# Patient Record
Sex: FEMALE | Race: WHITE | NOT HISPANIC OR LATINO | Employment: FULL TIME | URBAN - METROPOLITAN AREA
[De-identification: names, ages, dates, MRNs, and addresses within clinical notes are randomized per-mention and may not be internally consistent; named-entity substitution may affect disease eponyms.]

---

## 2017-06-01 ENCOUNTER — TRANSFERRED RECORDS (OUTPATIENT)
Dept: HEALTH INFORMATION MANAGEMENT | Facility: CLINIC | Age: 47
End: 2017-06-01

## 2017-06-27 ENCOUNTER — HOSPITAL ENCOUNTER (OUTPATIENT)
Dept: ULTRASOUND IMAGING | Facility: CLINIC | Age: 47
End: 2017-06-27
Attending: OBSTETRICS & GYNECOLOGY | Admitting: OBSTETRICS & GYNECOLOGY
Payer: COMMERCIAL

## 2017-06-27 ENCOUNTER — HOSPITAL ENCOUNTER (OUTPATIENT)
Facility: CLINIC | Age: 47
Discharge: HOME | End: 2017-06-27
Attending: OBSTETRICS & GYNECOLOGY | Admitting: OBSTETRICS & GYNECOLOGY
Payer: COMMERCIAL

## 2017-06-27 ENCOUNTER — TRANSFERRED RECORDS (OUTPATIENT)
Dept: HEALTH INFORMATION MANAGEMENT | Facility: CLINIC | Age: 47
End: 2017-06-27

## 2017-06-27 ENCOUNTER — ANESTHESIA EVENT (OUTPATIENT)
Dept: SURGERY | Facility: CLINIC | Age: 47
End: 2017-06-27
Payer: COMMERCIAL

## 2017-06-27 ENCOUNTER — ANESTHESIA (OUTPATIENT)
Dept: SURGERY | Facility: CLINIC | Age: 47
End: 2017-06-27
Payer: COMMERCIAL

## 2017-06-27 VITALS
SYSTOLIC BLOOD PRESSURE: 120 MMHG | WEIGHT: 169.4 LBS | OXYGEN SATURATION: 99 % | TEMPERATURE: 98 F | DIASTOLIC BLOOD PRESSURE: 72 MMHG | BODY MASS INDEX: 30.02 KG/M2 | RESPIRATION RATE: 16 BRPM | HEIGHT: 63 IN

## 2017-06-27 DIAGNOSIS — Z98.890 S/P D&C (STATUS POST DILATION AND CURETTAGE): Primary | ICD-10-CM

## 2017-06-27 DIAGNOSIS — N92.1 METRORRHAGIA: ICD-10-CM

## 2017-06-27 LAB — HCG SERPL QL: NEGATIVE

## 2017-06-27 PROCEDURE — 25000128 H RX IP 250 OP 636: Performed by: OBSTETRICS & GYNECOLOGY

## 2017-06-27 PROCEDURE — 88305 TISSUE EXAM BY PATHOLOGIST: CPT | Mod: 26 | Performed by: OBSTETRICS & GYNECOLOGY

## 2017-06-27 PROCEDURE — 25000128 H RX IP 250 OP 636: Performed by: ANESTHESIOLOGY

## 2017-06-27 PROCEDURE — 25000132 ZZH RX MED GY IP 250 OP 250 PS 637: Performed by: ANESTHESIOLOGY

## 2017-06-27 PROCEDURE — 71000013 ZZH RECOVERY PHASE 1 LEVEL 1 EA ADDTL HR: Performed by: OBSTETRICS & GYNECOLOGY

## 2017-06-27 PROCEDURE — 37000008 ZZH ANESTHESIA TECHNICAL FEE, 1ST 30 MIN: Performed by: OBSTETRICS & GYNECOLOGY

## 2017-06-27 PROCEDURE — 71000027 ZZH RECOVERY PHASE 2 EACH 15 MINS: Performed by: OBSTETRICS & GYNECOLOGY

## 2017-06-27 PROCEDURE — 25000128 H RX IP 250 OP 636: Performed by: NURSE ANESTHETIST, CERTIFIED REGISTERED

## 2017-06-27 PROCEDURE — 88305 TISSUE EXAM BY PATHOLOGIST: CPT | Performed by: OBSTETRICS & GYNECOLOGY

## 2017-06-27 PROCEDURE — 84703 CHORIONIC GONADOTROPIN ASSAY: CPT | Performed by: ANESTHESIOLOGY

## 2017-06-27 PROCEDURE — 36415 COLL VENOUS BLD VENIPUNCTURE: CPT | Performed by: ANESTHESIOLOGY

## 2017-06-27 PROCEDURE — 40000306 ZZH STATISTIC PRE PROC ASSESS II: Performed by: OBSTETRICS & GYNECOLOGY

## 2017-06-27 PROCEDURE — 71000012 ZZH RECOVERY PHASE 1 LEVEL 1 FIRST HR: Performed by: OBSTETRICS & GYNECOLOGY

## 2017-06-27 PROCEDURE — 36000050 ZZH SURGERY LEVEL 2 1ST 30 MIN: Performed by: OBSTETRICS & GYNECOLOGY

## 2017-06-27 PROCEDURE — 25000566 ZZH SEVOFLURANE, EA 15 MIN: Performed by: OBSTETRICS & GYNECOLOGY

## 2017-06-27 PROCEDURE — 27210794 ZZH OR GENERAL SUPPLY STERILE: Performed by: OBSTETRICS & GYNECOLOGY

## 2017-06-27 PROCEDURE — 25000125 ZZHC RX 250: Performed by: NURSE ANESTHETIST, CERTIFIED REGISTERED

## 2017-06-27 PROCEDURE — 37000009 ZZH ANESTHESIA TECHNICAL FEE, EACH ADDTL 15 MIN: Performed by: OBSTETRICS & GYNECOLOGY

## 2017-06-27 PROCEDURE — 40000864 US INTRAOPERATIVE

## 2017-06-27 RX ORDER — ONDANSETRON 2 MG/ML
INJECTION INTRAMUSCULAR; INTRAVENOUS PRN
Status: DISCONTINUED | OUTPATIENT
Start: 2017-06-27 | End: 2017-06-27

## 2017-06-27 RX ORDER — DIMENHYDRINATE 50 MG/ML
25 INJECTION, SOLUTION INTRAMUSCULAR; INTRAVENOUS
Status: DISCONTINUED | OUTPATIENT
Start: 2017-06-27 | End: 2017-06-27 | Stop reason: HOSPADM

## 2017-06-27 RX ORDER — PROMETHAZINE HYDROCHLORIDE 25 MG/ML
12.5 INJECTION, SOLUTION INTRAMUSCULAR; INTRAVENOUS
Status: DISCONTINUED | OUTPATIENT
Start: 2017-06-27 | End: 2017-06-27 | Stop reason: HOSPADM

## 2017-06-27 RX ORDER — LIDOCAINE 40 MG/G
CREAM TOPICAL
Status: DISCONTINUED | OUTPATIENT
Start: 2017-06-27 | End: 2017-06-27 | Stop reason: HOSPADM

## 2017-06-27 RX ORDER — SODIUM CHLORIDE, SODIUM LACTATE, POTASSIUM CHLORIDE, CALCIUM CHLORIDE 600; 310; 30; 20 MG/100ML; MG/100ML; MG/100ML; MG/100ML
INJECTION, SOLUTION INTRAVENOUS CONTINUOUS
Status: DISCONTINUED | OUTPATIENT
Start: 2017-06-27 | End: 2017-06-27 | Stop reason: HOSPADM

## 2017-06-27 RX ORDER — MEPERIDINE HYDROCHLORIDE 25 MG/ML
12.5 INJECTION INTRAMUSCULAR; INTRAVENOUS; SUBCUTANEOUS
Status: DISCONTINUED | OUTPATIENT
Start: 2017-06-27 | End: 2017-06-27 | Stop reason: HOSPADM

## 2017-06-27 RX ORDER — ONDANSETRON 2 MG/ML
4 INJECTION INTRAMUSCULAR; INTRAVENOUS EVERY 30 MIN PRN
Status: DISCONTINUED | OUTPATIENT
Start: 2017-06-27 | End: 2017-06-27 | Stop reason: HOSPADM

## 2017-06-27 RX ORDER — DEXAMETHASONE SODIUM PHOSPHATE 4 MG/ML
4 INJECTION, SOLUTION INTRA-ARTICULAR; INTRALESIONAL; INTRAMUSCULAR; INTRAVENOUS; SOFT TISSUE EVERY 10 MIN PRN
Status: DISCONTINUED | OUTPATIENT
Start: 2017-06-27 | End: 2017-06-27 | Stop reason: HOSPADM

## 2017-06-27 RX ORDER — FENTANYL CITRATE 50 UG/ML
25-50 INJECTION, SOLUTION INTRAMUSCULAR; INTRAVENOUS
Status: DISCONTINUED | OUTPATIENT
Start: 2017-06-27 | End: 2017-06-27 | Stop reason: HOSPADM

## 2017-06-27 RX ORDER — LIDOCAINE HYDROCHLORIDE 10 MG/ML
INJECTION, SOLUTION INFILTRATION; PERINEURAL PRN
Status: DISCONTINUED | OUTPATIENT
Start: 2017-06-27 | End: 2017-06-27

## 2017-06-27 RX ORDER — OXYCODONE HYDROCHLORIDE 5 MG/1
5 TABLET ORAL EVERY 4 HOURS PRN
Qty: 12 TABLET | Refills: 0 | Status: SHIPPED | OUTPATIENT
Start: 2017-06-27 | End: 2018-04-25

## 2017-06-27 RX ORDER — HYDRALAZINE HYDROCHLORIDE 20 MG/ML
2.5-5 INJECTION INTRAMUSCULAR; INTRAVENOUS EVERY 10 MIN PRN
Status: DISCONTINUED | OUTPATIENT
Start: 2017-06-27 | End: 2017-06-27 | Stop reason: HOSPADM

## 2017-06-27 RX ORDER — DEXAMETHASONE SODIUM PHOSPHATE 4 MG/ML
INJECTION, SOLUTION INTRA-ARTICULAR; INTRALESIONAL; INTRAMUSCULAR; INTRAVENOUS; SOFT TISSUE PRN
Status: DISCONTINUED | OUTPATIENT
Start: 2017-06-27 | End: 2017-06-27

## 2017-06-27 RX ORDER — DROPERIDOL 2.5 MG/ML
0.62 INJECTION, SOLUTION INTRAMUSCULAR; INTRAVENOUS
Status: DISCONTINUED | OUTPATIENT
Start: 2017-06-27 | End: 2017-06-27 | Stop reason: RX

## 2017-06-27 RX ORDER — NALOXONE HYDROCHLORIDE 0.4 MG/ML
.1-.4 INJECTION, SOLUTION INTRAMUSCULAR; INTRAVENOUS; SUBCUTANEOUS
Status: DISCONTINUED | OUTPATIENT
Start: 2017-06-27 | End: 2017-06-27 | Stop reason: HOSPADM

## 2017-06-27 RX ORDER — CEFAZOLIN SODIUM 2 G/100ML
2 INJECTION, SOLUTION INTRAVENOUS
Status: COMPLETED | OUTPATIENT
Start: 2017-06-27 | End: 2017-06-27

## 2017-06-27 RX ORDER — KETOROLAC TROMETHAMINE 30 MG/ML
30 INJECTION, SOLUTION INTRAMUSCULAR; INTRAVENOUS EVERY 6 HOURS PRN
Status: DISCONTINUED | OUTPATIENT
Start: 2017-06-27 | End: 2017-06-27 | Stop reason: HOSPADM

## 2017-06-27 RX ORDER — ACETAMINOPHEN 325 MG/1
650 TABLET ORAL ONCE
Status: COMPLETED | OUTPATIENT
Start: 2017-06-27 | End: 2017-06-27

## 2017-06-27 RX ORDER — METOCLOPRAMIDE 10 MG/1
10 TABLET ORAL EVERY 6 HOURS PRN
Status: DISCONTINUED | OUTPATIENT
Start: 2017-06-27 | End: 2017-06-27 | Stop reason: HOSPADM

## 2017-06-27 RX ORDER — PROPOFOL 10 MG/ML
INJECTION, EMULSION INTRAVENOUS PRN
Status: DISCONTINUED | OUTPATIENT
Start: 2017-06-27 | End: 2017-06-27

## 2017-06-27 RX ORDER — KETOROLAC TROMETHAMINE 30 MG/ML
30 INJECTION, SOLUTION INTRAMUSCULAR; INTRAVENOUS ONCE
Status: COMPLETED | OUTPATIENT
Start: 2017-06-27 | End: 2017-06-27

## 2017-06-27 RX ORDER — FENTANYL CITRATE 50 UG/ML
INJECTION, SOLUTION INTRAMUSCULAR; INTRAVENOUS PRN
Status: DISCONTINUED | OUTPATIENT
Start: 2017-06-27 | End: 2017-06-27

## 2017-06-27 RX ORDER — METOCLOPRAMIDE HYDROCHLORIDE 5 MG/ML
10 INJECTION INTRAMUSCULAR; INTRAVENOUS EVERY 6 HOURS PRN
Status: DISCONTINUED | OUTPATIENT
Start: 2017-06-27 | End: 2017-06-27 | Stop reason: HOSPADM

## 2017-06-27 RX ORDER — ONDANSETRON 4 MG/1
4 TABLET, ORALLY DISINTEGRATING ORAL EVERY 30 MIN PRN
Status: DISCONTINUED | OUTPATIENT
Start: 2017-06-27 | End: 2017-06-27 | Stop reason: HOSPADM

## 2017-06-27 RX ADMIN — KETOROLAC TROMETHAMINE 30 MG: 30 INJECTION, SOLUTION INTRAMUSCULAR at 06:43

## 2017-06-27 RX ADMIN — FENTANYL CITRATE 50 MCG: 50 INJECTION INTRAMUSCULAR; INTRAVENOUS at 09:22

## 2017-06-27 RX ADMIN — MIDAZOLAM HYDROCHLORIDE 2 MG: 1 INJECTION, SOLUTION INTRAMUSCULAR; INTRAVENOUS at 07:41

## 2017-06-27 RX ADMIN — CEFAZOLIN SODIUM 2 G: 2 INJECTION, SOLUTION INTRAVENOUS at 07:42

## 2017-06-27 RX ADMIN — DEXAMETHASONE SODIUM PHOSPHATE 4 MG: 4 INJECTION, SOLUTION INTRA-ARTICULAR; INTRALESIONAL; INTRAMUSCULAR; INTRAVENOUS; SOFT TISSUE at 07:46

## 2017-06-27 RX ADMIN — ONDANSETRON 4 MG: 2 INJECTION INTRAMUSCULAR; INTRAVENOUS at 08:00

## 2017-06-27 RX ADMIN — ACETAMINOPHEN 650 MG: 325 TABLET, FILM COATED ORAL at 09:58

## 2017-06-27 RX ADMIN — SODIUM CHLORIDE, POTASSIUM CHLORIDE, SODIUM LACTATE AND CALCIUM CHLORIDE: 600; 310; 30; 20 INJECTION, SOLUTION INTRAVENOUS at 06:32

## 2017-06-27 RX ADMIN — PROPOFOL 200 MG: 10 INJECTION, EMULSION INTRAVENOUS at 07:46

## 2017-06-27 RX ADMIN — LIDOCAINE HYDROCHLORIDE 50 MG: 10 INJECTION, SOLUTION INFILTRATION; PERINEURAL at 07:46

## 2017-06-27 RX ADMIN — FENTANYL CITRATE 100 MCG: 50 INJECTION, SOLUTION INTRAMUSCULAR; INTRAVENOUS at 07:46

## 2017-06-27 NOTE — ANESTHESIA PREPROCEDURE EVALUATION
Anesthesia Evaluation     . Pt has had prior anesthetic. Type: General    History of anesthetic complications   - PONV        ROS/MED HX    ENT/Pulmonary:  - neg pulmonary ROS     Neurologic:  - neg neurologic ROS     Cardiovascular:  - neg cardiovascular ROS       METS/Exercise Tolerance:     Hematologic:  - neg hematologic  ROS       Musculoskeletal:  - neg musculoskeletal ROS       GI/Hepatic:  - neg GI/hepatic ROS       Renal/Genitourinary:  - ROS Renal section negative       Endo:     (+) Other Endocrine Disorder Metrorrhagia .      Psychiatric:  - neg psychiatric ROS       Infectious Disease:  - neg infectious disease ROS       Malignancy:      - no malignancy   Other: Comment: Scarcoidosis, erythema nodosum   (+) No chance of pregnancy C-spine cleared: N/A, H/O Chronic Pain,no other significant disability   - neg other ROS                 Physical Exam  Normal systems: cardiovascular, pulmonary and dental    Airway   Mallampati: II  TM distance: >3 FB  Neck ROM: full    Dental     Cardiovascular       Pulmonary                     Anesthesia Plan      History & Physical Review  History and physical reviewed and following examination; no interval change.    ASA Status:  2 .    NPO Status:  > 8 hours    Plan for General with Intravenous induction. Maintenance will be Balanced.    PONV prophylaxis:  Ondansetron (or other 5HT-3) and Dexamethasone or Solumedrol       Postoperative Care  Postoperative pain management:  IV analgesics.      Consents  Anesthetic plan, risks, benefits and alternatives discussed with:  Patient.  Use of blood products discussed: Yes.   Use of blood products discussed with Patient.  Consented to blood products.  .                          .

## 2017-06-27 NOTE — BRIEF OP NOTE
[facility] Brief Operative Note    Pre-operative diagnosis: Metrorrhagia    Post-operative diagnosis Same   Procedure: Procedure(s):  DILATION AND CURETTAGE SUCTION WITH ULTRASOUND GUIDANCE  - Wound Class: II-Clean Contaminated   Surgeon(s): Surgeon(s) and Role:     * Joe Siegel MD - Primary   Estimated blood loss: 10 mL    Specimens:   ID Type Source Tests Collected by Time Destination   A : Endocervical Currettings  Tissue Endocervical SURGICAL PATHOLOGY EXAM Joe Siegel MD 6/27/2017  7:53 AM    B : Endometrial Currettings  Tissue Endometrium SURGICAL PATHOLOGY EXAM Joe Siegel MD 6/27/2017  7:54 AM    C : Additional Endometrial Currettings  Tissue Endometrium SURGICAL PATHOLOGY EXAM Joe Siegel MD 6/27/2017  7:59 AM       Findings: Uterus anteverted; sounded to 9 cm; minimal descensus with traction  Endometrium uniform 8 mm thickness after procedure  Normal EUA

## 2017-06-27 NOTE — ANESTHESIA CARE TRANSFER NOTE
Patient: Taisha Ordonez    Procedure(s):  DILATION AND CURETTAGE SUCTION WITH ULTRASOUND GUIDANCE  - Wound Class: II-Clean Contaminated    Diagnosis: Metrorrhagia   Diagnosis Additional Information: No value filed.    Anesthesia Type:   General     Note:  Airway :Face Mask  Patient transferred to:PACU        Vitals: (Last set prior to Anesthesia Care Transfer)    [facility] VITALS  6/27/2017 0742 - 6/27/2017 0814      6/27/2017             Pulse: 80    SpO2: 96 %    Resp Rate (observed): 10                Electronically Signed By: TEREZA Farias CRNA  June 27, 2017  8:14 AM

## 2017-06-27 NOTE — DISCHARGE INSTRUCTIONS
You received Toradol, an IV form of ibuprofen (Motrin) at 6:45am.  Do not take any ibuprofen products until 1:00pm.  Maximum acetaminophen (Tylenol) dose from all sources should not exceed 4 grams (4000 mg) per day.  You received 650mg Tylenol at 10:00.      DR. MAISHA TOLBERT M.D.   CLINIC PHONE NUMBER:[phone].        DILATION AND CURETTAGE DISCHARGE INSTRUCTIONS    PLEASE RETURN TO THE CLINIC IN:  ____1 WEEK  ____2 WEEKS  ____4 WEEKS  ____6 WEEKS  MAKE THIS APPOINTMENT AFTER YOU GET HOME IF IT HAS NOT ALREADY BEEN SCHEDULED.    DO NOT DRIVE A CAR, DRINK ALCOHOL OR USE MACHINERY FOR THE NEXT 24 HOURS.  YOU SHOULD WAIT UNTIL YOU HAVE RECOVERED BEFORE MAKING ANY IMPORTANT DECISIONS.    PAIN AND DISCOMFORT  YOU MAY HAVE CRAMPS OR A LOW BACKACHE FOR 24 TO 48 HOURS.  TYLENOL (ACETAMINOPHEN) OR MOTRIN (IBUPROFEN) MAY HELP, OR YOUR DOCTOR MAY GIVE YOU PAIN MEDICINE.  CALL YOUR DOCTOR IF PAIN CANNOT BE CONTROLLED.  YOU MAY FEEL DROWSY AND WEAK FOR A DAY OR TWO.    VAGINAL DISCHARGE  YOU MAY HAVE SOME BLEEDING OR DISCHARGE FOR UP TO TWO WEEKS.  DO NOT DOUCHE, USE TAMPONS OR HAVE SEX (INTERCOURSE) IN THE FIRST WEEK.  CALL YOUR DOCTOR IF YOU SOAK MORE THAN ONE MAXI PAD (SANITARY NAPKIN) PER HOUR, OR IF YOU PASS LARGE BLOOD CLOTS.    OTHER SYMPTOMS  YOU MAY HAVE A LOW FEVER FOR THE FIRST TWO DAYS.  CALL YOUR DOCTOR IF YOUR FEVER GOES OVER 101 DEGREES FAHRENHEIT.    IF YOU HAVE NAUSEA (FEEL SICK TO YOUR STOMACH), STAY IN BED.  TRY DRINKING A SMALL AMOUNT 7-UP, TEA OR SOUP.    DIET AND ACTIVITY  EAT LIGHT MEALS AND DRINK PLENTY OF FLUIDS FOR THE FIRST 24 HOURS (OR LONGER, IF YOU HAVE NAUSEA).    YOU MAY BATHE, SHOWER AND CLIMB STAIRS.  MOST WOMEN CAN RETURN TO WORK AFTER 24 HOURS.  YOU MAY GO BACK TO YOUR OTHER ACTIVITIES AFTER YOUR PAIN GOES AWAY.      GENERAL ANESTHESIA OR SEDATION ADULT DISCHARGE INSTRUCTIONS   SPECIAL PRECAUTIONS FOR 24 HOURS AFTER SURGERY    IT IS NOT UNUSUAL TO FEEL LIGHT-HEADED OR FAINT, UP TO 24  HOURS AFTER SURGERY OR WHILE TAKING PAIN MEDICATION.  IF YOU HAVE THESE SYMPTOMS; SIT FOR A FEW MINUTES BEFORE STANDING AND HAVE SOMEONE ASSIST YOU WHEN YOU GET UP TO WALK OR USE THE BATHROOM.    YOU SHOULD REST AND RELAX FOR THE NEXT 24 HOURS AND YOU MUST MAKE ARRANGEMENTS TO HAVE SOMEONE STAY WITH YOU FOR AT LEAST 24 HOURS AFTER YOUR DISCHARGE.  AVOID HAZARDOUS AND STRENUOUS ACTIVITIES.  DO NOT MAKE IMPORTANT DECISIONS FOR 24 HOURS.    DO NOT DRIVE ANY VEHICLE OR OPERATE MECHANICAL EQUIPMENT FOR 24 HOURS FOLLOWING THE END OF YOUR SURGERY.  EVEN THOUGH YOU MAY FEEL NORMAL, YOUR REACTIONS MAY BE AFFECTED BY THE MEDICATION YOU HAVE RECEIVED.    DO NOT DRINK ALCOHOLIC BEVERAGES FOR 24 HOURS FOLLOWING YOUR SURGERY.    DRINK CLEAR LIQUIDS (APPLE JUICE, GINGER ALE, 7-UP, BROTH, ETC.).  PROGRESS TO YOUR REGULAR DIET AS YOU FEEL ABLE.    YOU MAY HAVE A DRY MOUTH, A SORE THROAT, MUSCLES ACHES OR TROUBLE SLEEPING.  THESE SHOULD GO AWAY AFTER 24 HOURS.    CALL YOUR DOCTOR FOR ANY OF THE FOLLOWING:  SIGNS OF INFECTION (FEVER, GROWING TENDERNESS AT THE SURGERY SITE, A LARGE AMOUNT OF DRAINAGE OR BLEEDING, SEVERE PAIN, FOUL-SMELLING DRAINAGE, REDNESS OR SWELLING.    IT HAS BEEN OVER 8 TO 10 HOURS SINCE SURGERY AND YOU ARE STILL NOT ABLE TO URINATE (PASS WATER).

## 2017-06-27 NOTE — ANESTHESIA POSTPROCEDURE EVALUATION
Patient: Taisha Ordonez    Procedure(s):  DILATION AND CURETTAGE SUCTION WITH ULTRASOUND GUIDANCE  - Wound Class: II-Clean Contaminated    Diagnosis:Metrorrhagia   Diagnosis Additional Information: Metrorrhagia     Anesthesia Type:  General    Note:  Anesthesia Post Evaluation    Patient location during evaluation: PACU  Patient participation: Able to fully participate in evaluation  Level of consciousness: awake and alert  Pain management: adequate  Airway patency: patent  Cardiovascular status: acceptable  Respiratory status: acceptable  Hydration status: acceptable  PONV: controlled     Anesthetic complications: None          Last vitals:  Vitals:    06/27/17 0815 06/27/17 0820 06/27/17 0825   BP: 110/72 110/78 110/73   Resp: 16 15 15   Temp: 97.4  F (36.3  C)     SpO2: 97%           Electronically Signed By: Trevor Montes De Oca MD  June 27, 2017  8:29 AM

## 2017-06-27 NOTE — IP AVS SNAPSHOT
[facility]    [address]    [facility] [address]    Phone:  [phone]    Fax:  [phone]                                       After Visit Summary   6/27/2017    Taisha Ordonez    MRN: [id number]           After Visit Summary Signature Page     I have received my discharge instructions, and my questions have been answered. I have discussed any challenges I see with this plan with the nurse or doctor.    ..........................................................................................................................................  Patient/Patient Representative Signature      ..........................................................................................................................................  Patient Representative Print Name and Relationship to Patient    ..................................................               ................................................  Date                                            Time    ..........................................................................................................................................  Reviewed by Signature/Title    ...................................................              ..............................................  Date                                                            Time

## 2017-06-27 NOTE — IP AVS SNAPSHOT
MRN:[id number]                      After Visit Summary   6/27/2017    Taisha Ordonez    MRN: [id number]           Thank you!     Thank you for choosing [facility] for your care. Our goal is always to provide you with excellent care. Hearing back from our patients is one way we can continue to improve our services. Please take a few minutes to complete the written survey that you may receive in the mail after you visit. If you would like to speak to someone directly about your visit please contact Patient Relations at [phone]. Thank you!          Patient Information     Date Of Birth          1970        About your hospital stay     You were admitted on:  June 27, 2017 You last received care in the:  [facility]    You were discharged on:  June 27, 2017       Who to Call     For medical emergencies, please call 911.  For non-urgent questions about your medical care, please call your primary care provider or clinic, [phone]  For questions related to your surgery, please call your surgery clinic        Attending Provider     Provider Specialty    Joe Siegel MD OB/Gyn       Primary Care Provider Office Phone # Fax #    [facility] [phone]      After Care Instructions     Discharge Instructions       Resume pre procedure diet            Discharge Instructions       Pelvic Rest. No tampons, douching or intercourse for 2 weeks.            Discharge Instructions       Patient may return to work POD  1 or 2            Discharge Instructions       Patient may drive beginning POD 1 if not using narcotic pain pills            Discharge Instructions       Patient to arrange follow up appointment in 6 weeks            No alcohol       NO ALCOHOL for 24 hours post procedure            No driving or operating machinery       No driving or operating machinery until day after procedure                   Further instructions from your care team       You received Toradol, an IV form of ibuprofen (Motrin) at 6:45am.  Do not take any ibuprofen products until 1:00pm.  Maximum acetaminophen (Tylenol) dose from all sources should not exceed 4 grams (4000 mg) per day.  You received 650mg Tylenol at 10:00.      DR. MAISHA TOLBERT M.D.   CLINIC PHONE NUMBER:[phone].        DILATION AND CURETTAGE DISCHARGE INSTRUCTIONS    PLEASE RETURN TO THE CLINIC IN:  ____1 WEEK  ____2 WEEKS  ____4 WEEKS  ____6 WEEKS  MAKE THIS APPOINTMENT AFTER YOU GET HOME IF IT HAS NOT ALREADY BEEN SCHEDULED.    DO NOT DRIVE A CAR, DRINK ALCOHOL OR USE MACHINERY FOR THE NEXT 24 HOURS.  YOU SHOULD WAIT UNTIL YOU HAVE RECOVERED BEFORE MAKING ANY IMPORTANT DECISIONS.    PAIN AND DISCOMFORT  YOU MAY HAVE CRAMPS OR A LOW BACKACHE FOR 24 TO 48 HOURS.  TYLENOL (ACETAMINOPHEN) OR MOTRIN (IBUPROFEN) MAY HELP, OR YOUR DOCTOR MAY GIVE YOU PAIN MEDICINE.  CALL YOUR DOCTOR IF PAIN CANNOT BE CONTROLLED.  YOU MAY FEEL DROWSY AND WEAK FOR A DAY OR TWO.    VAGINAL DISCHARGE  YOU MAY HAVE SOME BLEEDING OR DISCHARGE FOR UP TO TWO WEEKS.  DO NOT DOUCHE, USE TAMPONS OR HAVE SEX (INTERCOURSE) IN THE FIRST WEEK.  CALL YOUR DOCTOR IF YOU SOAK MORE THAN ONE MAXI PAD (SANITARY NAPKIN) PER HOUR, OR IF YOU PASS LARGE BLOOD CLOTS.    OTHER SYMPTOMS  YOU MAY HAVE A LOW FEVER FOR THE FIRST TWO DAYS.  CALL YOUR DOCTOR IF YOUR FEVER GOES OVER 101 DEGREES FAHRENHEIT.    IF YOU HAVE NAUSEA (FEEL SICK TO YOUR STOMACH), STAY IN BED.  TRY DRINKING A SMALL AMOUNT 7-UP, TEA OR SOUP.    DIET AND ACTIVITY  EAT LIGHT MEALS AND DRINK PLENTY OF FLUIDS FOR THE FIRST 24 HOURS (OR LONGER, IF YOU HAVE NAUSEA).    YOU MAY BATHE, SHOWER AND CLIMB STAIRS.  MOST WOMEN CAN RETURN TO WORK AFTER 24 HOURS.  YOU MAY GO BACK TO YOUR OTHER ACTIVITIES AFTER YOUR PAIN GOES AWAY.      GENERAL ANESTHESIA OR SEDATION ADULT DISCHARGE INSTRUCTIONS   SPECIAL PRECAUTIONS FOR 24 HOURS AFTER SURGERY    IT IS NOT  "UNUSUAL TO FEEL LIGHT-HEADED OR FAINT, UP TO 24 HOURS AFTER SURGERY OR WHILE TAKING PAIN MEDICATION.  IF YOU HAVE THESE SYMPTOMS; SIT FOR A FEW MINUTES BEFORE STANDING AND HAVE SOMEONE ASSIST YOU WHEN YOU GET UP TO WALK OR USE THE BATHROOM.    YOU SHOULD REST AND RELAX FOR THE NEXT 24 HOURS AND YOU MUST MAKE ARRANGEMENTS TO HAVE SOMEONE STAY WITH YOU FOR AT LEAST 24 HOURS AFTER YOUR DISCHARGE.  AVOID HAZARDOUS AND STRENUOUS ACTIVITIES.  DO NOT MAKE IMPORTANT DECISIONS FOR 24 HOURS.    DO NOT DRIVE ANY VEHICLE OR OPERATE MECHANICAL EQUIPMENT FOR 24 HOURS FOLLOWING THE END OF YOUR SURGERY.  EVEN THOUGH YOU MAY FEEL NORMAL, YOUR REACTIONS MAY BE AFFECTED BY THE MEDICATION YOU HAVE RECEIVED.    DO NOT DRINK ALCOHOLIC BEVERAGES FOR 24 HOURS FOLLOWING YOUR SURGERY.    DRINK CLEAR LIQUIDS (APPLE JUICE, GINGER ALE, 7-UP, BROTH, ETC.).  PROGRESS TO YOUR REGULAR DIET AS YOU FEEL ABLE.    YOU MAY HAVE A DRY MOUTH, A SORE THROAT, MUSCLES ACHES OR TROUBLE SLEEPING.  THESE SHOULD GO AWAY AFTER 24 HOURS.    CALL YOUR DOCTOR FOR ANY OF THE FOLLOWING:  SIGNS OF INFECTION (FEVER, GROWING TENDERNESS AT THE SURGERY SITE, A LARGE AMOUNT OF DRAINAGE OR BLEEDING, SEVERE PAIN, FOUL-SMELLING DRAINAGE, REDNESS OR SWELLING.    IT HAS BEEN OVER 8 TO 10 HOURS SINCE SURGERY AND YOU ARE STILL NOT ABLE TO URINATE (PASS WATER).           Pending Results     Date and Time Order Name Status Description    6/27/2017 0754 Surgical pathology exam In process             Admission Information     Date & Time Provider Department Dept. Phone    6/27/2017 Joe Siegel MD [facility] [phone]      Your Vitals Were     Blood Pressure Temperature Respirations Height Weight       115/81 98.2  F (36.8  C) (Temporal) 16 1.6 m (5' 3\") 76.8 kg (169 lb 6.4 oz)     Pulse Oximetry BMI (Body Mass Index)                98% 30.01 kg/m2          MyChart Information     [facility] lets you send messages to your doctor, view your test " "results, renew your prescriptions, schedule appointments and more. To sign up, go to [email or web address] . Click on \"Log in\" on the left side of the screen, which will take you to the Welcome page. Then click on \"Sign up Now\" on the right side of the page.     You will be asked to enter the access code listed below, as well as some personal information. Please follow the directions to create your username and password.     Your access code is: [email or web address]  Expires: 2017 10:08 AM     Your access code will  in 90 days. If you need help or a new code, please call your [facility] clinic or [phone].        Care EveryWhere ID     This is your Care EveryWhere ID. This could be used by other organizations to access your [facility] medical records  [id number]        Equal Access to Services     SADE PERRY : Kandice Treadwell, elyssa nelson, amos elena, lidia rajan . So [facility] [phone].    ATENCIÓN: Si habla español, tiene a cotto disposición servicios gratuitos de asistencia lingüística. Kaur al [phone].    We comply with applicable federal civil rights laws and [place] laws. We do not discriminate on the basis of race, color, national origin, age, disability sex, sexual orientation or gender identity.               Review of your medicines      START taking        Dose / Directions    oxyCODONE 5 MG IR tablet   Commonly known as:  ROXICODONE   Used for:  S/P D&C (status post dilation and curettage)        Dose:  5 mg   Take 1 tablet (5 mg) by mouth every 4 hours as needed for moderate to severe pain maximum 4 tablet(s) per day   Quantity:  12 tablet   Refills:  0         CONTINUE these medicines which have NOT CHANGED        Dose / Directions    PREDNISONE PO   Indication:  sarcoidosis        Dose:  5 mg   Take 5 mg by mouth as needed (dosage varies based on flare ups with sarcoidosis)   Refills:  0       SONATA PO        Dose:  5 mg "   Take 5 mg by mouth nightly as needed for sleep   Refills:  0            Where to get your medicines      Some of these will need a paper prescription and others can be bought over the counter. Ask your nurse if you have questions.     Bring a paper prescription for each of these medications     oxyCODONE 5 MG IR tablet                Protect others around you: Learn how to safely use, store and throw away your medicines at www.disposemymeds.org.             Medication List: This is a list of all your medications and when to take them. Check marks below indicate your daily home schedule. Keep this list as a reference.      Medications           Morning Afternoon Evening Bedtime As Needed    oxyCODONE 5 MG IR tablet   Commonly known as:  ROXICODONE   Take 1 tablet (5 mg) by mouth every 4 hours as needed for moderate to severe pain maximum 4 tablet(s) per day                                PREDNISONE PO   Take 5 mg by mouth as needed (dosage varies based on flare ups with sarcoidosis)                                SONATA PO   Take 5 mg by mouth nightly as needed for sleep

## 2017-06-28 LAB — COPATH REPORT: NORMAL

## 2017-07-23 NOTE — OP NOTE
Surgeon / Clinician: Joe Siegel MD    Date of surgery:  06/27/2017    Surgeon:  Joe Siegel MD    Assistants:  None.    Preoperative diagnosis:  Metrorrhagia.     Postoperative diagnosis:  Metrorrhagia.    Name of procedure:  Dilatation and curettage with ultrasound guidance.    NOTE: This report was re-dictated by memory as the original dictation from the day of surgery was lost.    Indications for surgery:  The patient is a 47-year-old black female with irregular perimenopausal bleeding.  Ultrasound examination suggested possible endometrial polyps and the patient was counseled on D&C with ultrasound guidance would be performed at this time for evaluation and treatment.  Potential risks and complications as well as alternative management strategies were reviewed.  The patient expressed understanding and consent was obtained.    Operative findings:    1.  Uterus of normal size and contour.  The endometrium was uniformly thin to transabdominal ultrasound evaluation following the procedure.  2.  Otherwise unremarkable exam under anesthesia.    Description of procedure:  After obtaining adequate anesthesia, the patient was placed in the dorsal lithotomy position and the perineal and vaginal fields prepped and draped in the usual sterile manner.  The Saucedo catheter was installed into the bladder to allow for adequate visualization during the procedure.  The endocervical curettage was performed with sharp instrument and specimen was submitted to pathology.  With ultrasound guidance, the uterus was sounded as noted and the endometrial cavity was subjected to sharp curettage.  Following this procedure, the endometrium appeared uniformly thin to transabdominal ultrasound.  Instruments were then removed from the vagina.  The Saucedo catheter was unclamped and the urine was allowed to drain from the bladder.  After the bladder was emptied, the Saucedo catheter bulb was deflated and the catheter removed.  Final sponge  counts were correct.  Estimated blood loss was 20 mL.  The patient was recalled from anesthesia without difficulty and left the OR in stable condition.        Joe Siegel MD    D:  07/20/2017 11:58 T:  07/23/2017 10:40  Document:  [id number] DA\CD    cc: Joe Siegel MD  Attn:  Celine Klein

## 2017-08-05 NOTE — OP NOTE
PROVIDER:  Joe Siegel MD   WORKTYPE:  Operation  PATIENT:  Taisha Ordonez  MRN:  [id number]  :   1970  DATE OF SURGERY:  2017      SURGEON:  Joe Siegel MD     ASSISTANT:  None.     PREOPERATIVE DIAGNOSIS:  Metrorrhagia.    POSTOPERATIVE DIAGNOSIS:  Metrorrhagia.    PROCEDURE PERFORMED:  Dilatation and curettage with ultrasound guidance.    INDICATIONS FOR SURGERY:  This dictation was prepared from memory several weeks after the surgery because the original dictation did not go through the transcription process per routine.    The patient is a 47-year-old white female,  4, para  with recent LMP, who was scheduled for D and C for evaluation of metrorrhagia.  At her recent annual examination, the patient reported that she had irregular menses, sometimes going upwards of 70 days without a menses.  As part of the patient's evaluation, she had a normal Pap smear.  A pelvic ultrasound showed thickened endometrium with some evidence of some possible cystic changes.  The patient was advised regarding treatment options and wished to proceed with D and C at this time.  Potential risks and complications were also reviewed.  Written consent was obtained.    OPERATIVE FINDINGS:   1.  Uterus sounded to a depth of 9 cm.  2.  The uterus is anteverted in position.  3.  Minimal descensus of the uterus with traction with a tenaculum.  4.  Endometrium was uniformly 8 mm thin after the procedure.  5.  Otherwise unremarkable exam under anesthesia.     DESCRIPTION OF PROCEDURE:  After obtaining adequate general anesthesia, the patient was placed in the dorsal lithotomy position and the perineal and vaginal fields prepped and draped in the usual sterile manner.  Transabdominal ultrasound showed the bladder was sufficiently filled for visualization.  A tenaculum was placed on the anterior lip of the cervix and exam performed with findings as noted.  Endocervical curettage was performed and specimen  submitted to pathology.  With ultrasound guidance, the uterus was sounded as noted and dilated until a #9 Blanca passed easily.  An 8 mm curved vacuum curet was attached to suction and used to remove most of the tissue from the cavity of the uterus.  Sharp curettage as well as exploration with Gaston Stone polyp forceps was also performed and specimen added to the endometrial curettings.  The instruments were then removed from the vagina.  Blood loss was estimated at 10 mL.  The bladder was straight catheterized for 400 mL of clear urine.  Final sponge counts were correct.  The patient was taken off anesthesia without difficulty and left the OR in stable condition.

## 2018-05-01 ENCOUNTER — ANESTHESIA (OUTPATIENT)
Dept: SURGERY | Facility: CLINIC | Age: 48
End: 2018-05-01
Payer: COMMERCIAL

## 2018-05-01 ENCOUNTER — ANESTHESIA EVENT (OUTPATIENT)
Dept: SURGERY | Facility: CLINIC | Age: 48
End: 2018-05-01
Payer: COMMERCIAL

## 2018-05-01 ENCOUNTER — HOSPITAL ENCOUNTER (OUTPATIENT)
Facility: CLINIC | Age: 48
Discharge: HOME | End: 2018-05-01
Attending: OBSTETRICS & GYNECOLOGY | Admitting: OBSTETRICS & GYNECOLOGY
Payer: COMMERCIAL

## 2018-05-01 VITALS
SYSTOLIC BLOOD PRESSURE: 110 MMHG | OXYGEN SATURATION: 99 % | TEMPERATURE: 98.5 F | BODY MASS INDEX: 27.18 KG/M2 | DIASTOLIC BLOOD PRESSURE: 80 MMHG | WEIGHT: 153.4 LBS | HEIGHT: 63 IN | RESPIRATION RATE: 16 BRPM

## 2018-05-01 DIAGNOSIS — Z90.710 S/P HYSTERECTOMY: ICD-10-CM

## 2018-05-01 DIAGNOSIS — N85.01 COMPLEX ENDOMETRIAL HYPERPLASIA WITHOUT ATYPIA: ICD-10-CM

## 2018-05-01 DIAGNOSIS — D25.1 FIBROIDS, INTRAMURAL: Primary | ICD-10-CM

## 2018-05-01 DIAGNOSIS — N92.1 MENOMETRORRHAGIA: ICD-10-CM

## 2018-05-01 LAB
ABO + RH BLD: NORMAL
ABO + RH BLD: NORMAL
BLD GP AB SCN SERPL QL: NORMAL
BLOOD BANK CMNT PATIENT-IMP: NORMAL
HCG UR QL: NEGATIVE
HGB BLD-MCNC: 12.6 G/DL (ref 11.7–15.7)
SPECIMEN EXP DATE BLD: NORMAL

## 2018-05-01 PROCEDURE — 25000125 ZZHC RX 250: Performed by: NURSE ANESTHETIST, CERTIFIED REGISTERED

## 2018-05-01 PROCEDURE — 25000566 ZZH SEVOFLURANE, EA 15 MIN: Performed by: OBSTETRICS & GYNECOLOGY

## 2018-05-01 PROCEDURE — 37000008 ZZH ANESTHESIA TECHNICAL FEE, 1ST 30 MIN: Performed by: OBSTETRICS & GYNECOLOGY

## 2018-05-01 PROCEDURE — 71000013 ZZH RECOVERY PHASE 1 LEVEL 1 EA ADDTL HR: Performed by: OBSTETRICS & GYNECOLOGY

## 2018-05-01 PROCEDURE — 88304 TISSUE EXAM BY PATHOLOGIST: CPT | Mod: 26 | Performed by: OBSTETRICS & GYNECOLOGY

## 2018-05-01 PROCEDURE — 40000306 ZZH STATISTIC PRE PROC ASSESS II: Performed by: OBSTETRICS & GYNECOLOGY

## 2018-05-01 PROCEDURE — 36000058 ZZH SURGERY LEVEL 3 EA 15 ADDTL MIN: Performed by: OBSTETRICS & GYNECOLOGY

## 2018-05-01 PROCEDURE — 86901 BLOOD TYPING SEROLOGIC RH(D): CPT | Performed by: OBSTETRICS & GYNECOLOGY

## 2018-05-01 PROCEDURE — 71000027 ZZH RECOVERY PHASE 2 EACH 15 MINS: Performed by: OBSTETRICS & GYNECOLOGY

## 2018-05-01 PROCEDURE — 37000009 ZZH ANESTHESIA TECHNICAL FEE, EACH ADDTL 15 MIN: Performed by: OBSTETRICS & GYNECOLOGY

## 2018-05-01 PROCEDURE — 25000128 H RX IP 250 OP 636: Performed by: NURSE ANESTHETIST, CERTIFIED REGISTERED

## 2018-05-01 PROCEDURE — 25000128 H RX IP 250 OP 636: Performed by: ANESTHESIOLOGY

## 2018-05-01 PROCEDURE — 36415 COLL VENOUS BLD VENIPUNCTURE: CPT | Performed by: OBSTETRICS & GYNECOLOGY

## 2018-05-01 PROCEDURE — 88307 TISSUE EXAM BY PATHOLOGIST: CPT | Mod: 26 | Performed by: OBSTETRICS & GYNECOLOGY

## 2018-05-01 PROCEDURE — 25000132 ZZH RX MED GY IP 250 OP 250 PS 637: Performed by: ANESTHESIOLOGY

## 2018-05-01 PROCEDURE — 25000132 ZZH RX MED GY IP 250 OP 250 PS 637: Performed by: OBSTETRICS & GYNECOLOGY

## 2018-05-01 PROCEDURE — 85018 HEMOGLOBIN: CPT | Performed by: OBSTETRICS & GYNECOLOGY

## 2018-05-01 PROCEDURE — 81025 URINE PREGNANCY TEST: CPT | Performed by: ANESTHESIOLOGY

## 2018-05-01 PROCEDURE — 36000056 ZZH SURGERY LEVEL 3 1ST 30 MIN: Performed by: OBSTETRICS & GYNECOLOGY

## 2018-05-01 PROCEDURE — 25800025 ZZH RX 258: Performed by: OBSTETRICS & GYNECOLOGY

## 2018-05-01 PROCEDURE — 71000012 ZZH RECOVERY PHASE 1 LEVEL 1 FIRST HR: Performed by: OBSTETRICS & GYNECOLOGY

## 2018-05-01 PROCEDURE — 25000128 H RX IP 250 OP 636: Performed by: OBSTETRICS & GYNECOLOGY

## 2018-05-01 PROCEDURE — 88307 TISSUE EXAM BY PATHOLOGIST: CPT | Performed by: OBSTETRICS & GYNECOLOGY

## 2018-05-01 PROCEDURE — 88304 TISSUE EXAM BY PATHOLOGIST: CPT | Performed by: OBSTETRICS & GYNECOLOGY

## 2018-05-01 PROCEDURE — 86850 RBC ANTIBODY SCREEN: CPT | Performed by: OBSTETRICS & GYNECOLOGY

## 2018-05-01 PROCEDURE — 86900 BLOOD TYPING SEROLOGIC ABO: CPT | Performed by: OBSTETRICS & GYNECOLOGY

## 2018-05-01 PROCEDURE — 27210794 ZZH OR GENERAL SUPPLY STERILE: Performed by: OBSTETRICS & GYNECOLOGY

## 2018-05-01 RX ORDER — CEFAZOLIN SODIUM 1 G/3ML
1 INJECTION, POWDER, FOR SOLUTION INTRAMUSCULAR; INTRAVENOUS SEE ADMIN INSTRUCTIONS
Status: DISCONTINUED | OUTPATIENT
Start: 2018-05-01 | End: 2018-05-01 | Stop reason: HOSPADM

## 2018-05-01 RX ORDER — GLYCOPYRROLATE 0.2 MG/ML
INJECTION, SOLUTION INTRAMUSCULAR; INTRAVENOUS PRN
Status: DISCONTINUED | OUTPATIENT
Start: 2018-05-01 | End: 2018-05-01

## 2018-05-01 RX ORDER — FENTANYL CITRATE 50 UG/ML
25-50 INJECTION, SOLUTION INTRAMUSCULAR; INTRAVENOUS
Status: DISCONTINUED | OUTPATIENT
Start: 2018-05-01 | End: 2018-05-01 | Stop reason: HOSPADM

## 2018-05-01 RX ORDER — ONDANSETRON 2 MG/ML
4 INJECTION INTRAMUSCULAR; INTRAVENOUS EVERY 30 MIN PRN
Status: DISCONTINUED | OUTPATIENT
Start: 2018-05-01 | End: 2018-05-01 | Stop reason: HOSPADM

## 2018-05-01 RX ORDER — PROPOFOL 10 MG/ML
INJECTION, EMULSION INTRAVENOUS CONTINUOUS PRN
Status: DISCONTINUED | OUTPATIENT
Start: 2018-05-01 | End: 2018-05-01

## 2018-05-01 RX ORDER — OXYCODONE HYDROCHLORIDE 5 MG/1
5 TABLET ORAL ONCE
Status: COMPLETED | OUTPATIENT
Start: 2018-05-01 | End: 2018-05-01

## 2018-05-01 RX ORDER — PROPOFOL 10 MG/ML
INJECTION, EMULSION INTRAVENOUS PRN
Status: DISCONTINUED | OUTPATIENT
Start: 2018-05-01 | End: 2018-05-01

## 2018-05-01 RX ORDER — OXYCODONE HYDROCHLORIDE 5 MG/1
5-10 TABLET ORAL
Qty: 30 TABLET | Refills: 0 | Status: SHIPPED | OUTPATIENT
Start: 2018-05-01 | End: 2024-09-19

## 2018-05-01 RX ORDER — ONDANSETRON 4 MG/1
4 TABLET, ORALLY DISINTEGRATING ORAL EVERY 30 MIN PRN
Status: DISCONTINUED | OUTPATIENT
Start: 2018-05-01 | End: 2018-05-01 | Stop reason: HOSPADM

## 2018-05-01 RX ORDER — NALOXONE HYDROCHLORIDE 0.4 MG/ML
.1-.4 INJECTION, SOLUTION INTRAMUSCULAR; INTRAVENOUS; SUBCUTANEOUS
Status: DISCONTINUED | OUTPATIENT
Start: 2018-05-01 | End: 2018-05-01 | Stop reason: HOSPADM

## 2018-05-01 RX ORDER — SODIUM CHLORIDE, SODIUM LACTATE, POTASSIUM CHLORIDE, CALCIUM CHLORIDE 600; 310; 30; 20 MG/100ML; MG/100ML; MG/100ML; MG/100ML
INJECTION, SOLUTION INTRAVENOUS CONTINUOUS
Status: DISCONTINUED | OUTPATIENT
Start: 2018-05-01 | End: 2018-05-01 | Stop reason: HOSPADM

## 2018-05-01 RX ORDER — ONDANSETRON 2 MG/ML
INJECTION INTRAMUSCULAR; INTRAVENOUS PRN
Status: DISCONTINUED | OUTPATIENT
Start: 2018-05-01 | End: 2018-05-01

## 2018-05-01 RX ORDER — LIDOCAINE 40 MG/G
CREAM TOPICAL
Status: DISCONTINUED | OUTPATIENT
Start: 2018-05-01 | End: 2018-05-01 | Stop reason: HOSPADM

## 2018-05-01 RX ORDER — DEXAMETHASONE SODIUM PHOSPHATE 4 MG/ML
INJECTION, SOLUTION INTRA-ARTICULAR; INTRALESIONAL; INTRAMUSCULAR; INTRAVENOUS; SOFT TISSUE PRN
Status: DISCONTINUED | OUTPATIENT
Start: 2018-05-01 | End: 2018-05-01

## 2018-05-01 RX ORDER — OXYCODONE HYDROCHLORIDE 5 MG/1
5 TABLET ORAL
Status: COMPLETED | OUTPATIENT
Start: 2018-05-01 | End: 2018-05-01

## 2018-05-01 RX ORDER — MEPERIDINE HYDROCHLORIDE 25 MG/ML
12.5 INJECTION INTRAMUSCULAR; INTRAVENOUS; SUBCUTANEOUS
Status: DISCONTINUED | OUTPATIENT
Start: 2018-05-01 | End: 2018-05-01 | Stop reason: HOSPADM

## 2018-05-01 RX ORDER — CEFAZOLIN SODIUM 2 G/100ML
2 INJECTION, SOLUTION INTRAVENOUS
Status: COMPLETED | OUTPATIENT
Start: 2018-05-01 | End: 2018-05-01

## 2018-05-01 RX ORDER — LIDOCAINE HYDROCHLORIDE 10 MG/ML
INJECTION, SOLUTION INFILTRATION; PERINEURAL PRN
Status: DISCONTINUED | OUTPATIENT
Start: 2018-05-01 | End: 2018-05-01

## 2018-05-01 RX ORDER — NEOSTIGMINE METHYLSULFATE 1 MG/ML
VIAL (ML) INJECTION PRN
Status: DISCONTINUED | OUTPATIENT
Start: 2018-05-01 | End: 2018-05-01

## 2018-05-01 RX ORDER — FENTANYL CITRATE 50 UG/ML
INJECTION, SOLUTION INTRAMUSCULAR; INTRAVENOUS PRN
Status: DISCONTINUED | OUTPATIENT
Start: 2018-05-01 | End: 2018-05-01

## 2018-05-01 RX ADMIN — SODIUM CHLORIDE, POTASSIUM CHLORIDE, SODIUM LACTATE AND CALCIUM CHLORIDE: 600; 310; 30; 20 INJECTION, SOLUTION INTRAVENOUS at 07:34

## 2018-05-01 RX ADMIN — ONDANSETRON 4 MG: 2 INJECTION INTRAMUSCULAR; INTRAVENOUS at 08:15

## 2018-05-01 RX ADMIN — FENTANYL CITRATE 50 MCG: 50 INJECTION, SOLUTION INTRAMUSCULAR; INTRAVENOUS at 08:06

## 2018-05-01 RX ADMIN — LIDOCAINE HYDROCHLORIDE 30 MG: 10 INJECTION, SOLUTION INFILTRATION; PERINEURAL at 07:40

## 2018-05-01 RX ADMIN — SODIUM CHLORIDE, POTASSIUM CHLORIDE, SODIUM LACTATE AND CALCIUM CHLORIDE: 600; 310; 30; 20 INJECTION, SOLUTION INTRAVENOUS at 11:45

## 2018-05-01 RX ADMIN — ONDANSETRON 4 MG: 2 INJECTION INTRAMUSCULAR; INTRAVENOUS at 11:45

## 2018-05-01 RX ADMIN — MIDAZOLAM 2 MG: 1 INJECTION INTRAMUSCULAR; INTRAVENOUS at 07:34

## 2018-05-01 RX ADMIN — Medication 3 MG: at 09:40

## 2018-05-01 RX ADMIN — PROPOFOL 75 MCG/KG/MIN: 10 INJECTION, EMULSION INTRAVENOUS at 07:50

## 2018-05-01 RX ADMIN — FENTANYL CITRATE 150 MCG: 50 INJECTION, SOLUTION INTRAMUSCULAR; INTRAVENOUS at 07:40

## 2018-05-01 RX ADMIN — SODIUM CHLORIDE, POTASSIUM CHLORIDE, SODIUM LACTATE AND CALCIUM CHLORIDE: 600; 310; 30; 20 INJECTION, SOLUTION INTRAVENOUS at 09:05

## 2018-05-01 RX ADMIN — ROCURONIUM BROMIDE 35 MG: 10 INJECTION INTRAVENOUS at 07:40

## 2018-05-01 RX ADMIN — PROPOFOL 160 MG: 10 INJECTION, EMULSION INTRAVENOUS at 07:40

## 2018-05-01 RX ADMIN — FENTANYL CITRATE 50 MCG: 50 INJECTION INTRAMUSCULAR; INTRAVENOUS at 11:06

## 2018-05-01 RX ADMIN — GLYCOPYRROLATE 0.4 MG: 0.2 INJECTION, SOLUTION INTRAMUSCULAR; INTRAVENOUS at 09:40

## 2018-05-01 RX ADMIN — OXYCODONE HYDROCHLORIDE 5 MG: 5 TABLET ORAL at 11:45

## 2018-05-01 RX ADMIN — CEFAZOLIN SODIUM 2 G: 2 INJECTION, SOLUTION INTRAVENOUS at 07:34

## 2018-05-01 RX ADMIN — OXYCODONE HYDROCHLORIDE 5 MG: 5 TABLET ORAL at 13:24

## 2018-05-01 RX ADMIN — DEXAMETHASONE SODIUM PHOSPHATE 8 MG: 4 INJECTION, SOLUTION INTRA-ARTICULAR; INTRALESIONAL; INTRAMUSCULAR; INTRAVENOUS; SOFT TISSUE at 07:40

## 2018-05-01 RX ADMIN — FENTANYL CITRATE 50 MCG: 50 INJECTION, SOLUTION INTRAMUSCULAR; INTRAVENOUS at 08:13

## 2018-05-01 ASSESSMENT — LIFESTYLE VARIABLES: TOBACCO_USE: 0

## 2018-05-01 ASSESSMENT — ENCOUNTER SYMPTOMS: DYSRHYTHMIAS: 0

## 2018-05-01 NOTE — OP NOTE
Procedure Date: 05/01/2018      DATE OF OPERATION:  05/01/2018      PREOPERATIVE DIAGNOSES:   1.  Menometrorrhagia.   2.  Uterine fibroids.    3.  Complex endometrial hyperplasia.      POSTOPERATIVE DIAGNOSES:   1.  Menometrorrhagia.   2.  Uterine fibroids.    3.  Complex endometrial hyperplasia.      PROCEDURES:     1.  Total laparoscopic hysterectomy.    2.  Bilateral salpingectomy.    3.  Bilateral ovarian cyst excision.      SURGEON:  Andre Hurtado MD      ASSISTANT:  Eneida Valerio MD      ANESTHESIA:  General.      INDICATIONS:  Taisha Ordonez is a 47-year-old  female, para 2, with longstanding menometrorrhagia, multiple uterine fibroids and a recent D and C to address her menometrorrhagia that revealed complex endometrial hyperplasia without atypia.  Risks, benefits and alternatives of management of her complex situation was discussed and definitive management, both for the fibroids, the menometrorrhagia, as well as the endometrial hyperplasia with advised.  Surgical procedure explained in detail and consent obtained.      OPERATIVE FINDINGS:  Examination under anesthesia demonstrated a midline mobile uterus.  At the time of hysterectomy, the uterus was approximately 12 weeks size.  There was a simple cyst on the right ovary.  There was a complex cyst on the left ovary which was excised.  The distal tubal remnants appeared normal.  The upper abdomen had some adhesions from the patient's prior laparotomy, but no other focal abnormalities seen within the pelvis.      OPERATIVE PROCEDURE:  After adequate general anesthesia was obtained, the patient was placed in a dorsal lithotomy position, prepped and draped in the usual sterile fashion.  A Saucedo catheter was placed with return of clear urine.      A speculum was inserted and the cervix visualized.  The anterior lip of the cervix was grasped with a single-tooth tenaculum.  The uterus was sounded.  A series of dilators up to 6 mm were passed  sequentially through the endocervix.  A VCare cup balloon was then advanced into the uterine cavity, inflated and the VCare cup tightly affixed to the cervix after removing the tenaculum.  The speculum was then removed from the vagina and the perineum draped from the abdominal field.      A 5 mm infraumbilical incision was made.  A Veress needle was inserted and its proper intraperitoneal positioning confirmed by the drop saline method.  After instillation of approximately 1-1/2 liters of carbon dioxide, the Veress needle was withdrawn and a 5 mm trocar and sheath inserted.  The trocar was removed, the laparoscope inserted with proper intraperitoneal positioning confirmed.      Two additional stab incisions were made in the left and right lower quadrants respectively and 5 mm ports inserted.      We then used a Thunderbeat device to excise the distal tubes.  The left remained attached to the uterus.  The right was removed through the 5 mm port.  We used then the Thunderbeat to seal and separate the vessels along the round ligaments, broad ligaments, cardinal ligaments to the level of the cervix.  The vesicouterine peritoneum was tented and incised and the bladder bluntly  from the lower uterine segment and cervix.  When we had dissected along laterally to the level of the cervix and were able to palpate and visualize the VCare cup, we then used the Thunderbeat to cut through the vaginal epithelium into the VCare cup groove and extended this circumferentially, thus  the cervix, uterus and left tubal fragment from the vagina.      The specimen was then removed vaginally.  The vaginal apex was then closed with interrupted figure-of-X sutures of 0 Vicryl.      We recreated the pneumoperitoneum and all pedicles were inspected and hemostatic.  The left ovarian cyst was excised and removed through a 5 mm port.      When we were assured of hemostasis, Unique was sprinkled over all raw surface edges, the  accessory ports removed under direct visualization, the abdomen evacuated of the carbon dioxide to the extent possible and the laparoscope and laparoscopic sheath removed.  Skin edges were reapproximated using interrupted sutures of 4-0 Monocryl.  Steri-Strips and sterile bandages were placed.  The patient was recalled from general anesthesia and taken to the recovery room in satisfactory condition.  There were no intraoperative complications.  Estimated blood loss was 150 mL.         RHONDA WARD MD             D: 2018   T: 2018   MT: KRISTY      Name:     JOE TAMAYO   MRN:      [id number]        Account:        [id number]   :      1970           Procedure Date: 2018      Document: [id number]       cc: [facility]

## 2018-05-01 NOTE — ANESTHESIA CARE TRANSFER NOTE
Patient: Taisha Ordonez    Procedure(s):  total laparascopic hysterectomy with bilateral salpingectomy, left ovarian cystectomy, pelvic exam under anesthesia - Wound Class: II-Clean Contaminated    Diagnosis: fibroids endometrial hyperplasia and menometrorrhagia  Diagnosis Additional Information: No value filed.    Anesthesia Type:   General, ETT     Note:  Airway :Face Mask  Patient transferred to:PACU  Comments: Report and signed off to RN in PACU.  Good Resps, skin pink, VSS, O2 via face tent.      Vitals: (Last set prior to Anesthesia Care Transfer)    CRNA VITALS  5/1/2018 0922 - 5/1/2018 0958      5/1/2018             Pulse: 98    SpO2: 97 %                Electronically Signed By: TEREZA Talavera CRNA  May 1, 2018  9:58 AM

## 2018-05-01 NOTE — ANESTHESIA POSTPROCEDURE EVALUATION
Patient: Taisha Ordonez    Procedure(s):  total laparascopic hysterectomy with bilateral salpingectomy, left ovarian cystectomy, pelvic exam under anesthesia - Wound Class: II-Clean Contaminated    Diagnosis:fibroids endometrial hyperplasia and menometrorrhagia  Diagnosis Additional Information: PREOPERATIVE DIAGNOSES:   1.  Menometrorrhagia.   2.  Uterine fibroids.    3.  Complex endometrial hyperplasia.       POSTOPERATIVE DIAGNOSES:   1.  Menometrorrhagia.   2.  Uterine fibroids.    3.  Complex endometrial hyperplasia.       PROCEDURES:  ,    1.  Total laparoscopic hysterectomy.    2.  Bilateral salpingectomy.    3.  Bilateral ovarian cyst excision.         Anesthesia Type:  General, ETT    Note:  Anesthesia Post Evaluation    Patient location during evaluation: Phase 2  Patient participation: Able to fully participate in evaluation  Level of consciousness: awake  Pain management: adequate  Airway patency: patent  Cardiovascular status: acceptable  Respiratory status: acceptable  Hydration status: euvolemic  PONV: controlled     Anesthetic complications: None          Last vitals:  Vitals:    05/01/18 1215 05/01/18 1218 05/01/18 1222   BP: 107/73  111/69   Resp: 15  16   Temp:  98.8  F (37.1  C)    SpO2: 96%  100%         Electronically Signed By: Pepe Up MD  May 1, 2018  12:34 PM

## 2018-05-01 NOTE — BRIEF OP NOTE
[facility] Brief Operative Note    Pre-operative diagnosis: fibroids endometrial hyperplasia and menometrorrhagia   Post-operative diagnosis menometrorrhagia, fibroids,endometrial hyperplasia     Procedure: Procedure(s):  total laparascopic hysterectomy with bilateral salpingectomy, left ovarian cystectomy, pelvic exam under anesthesia - Wound Class: II-Clean Contaminated   Surgeon(s): Surgeon(s) and Role:     * Eneida Valerio MD - Assisting     * Andre Hurtado MD - Primary   Estimated blood loss: 150 mL    Specimens:   ID Type Source Tests Collected by Time Destination   A : Uterus, cervix, bilateral fallopian tubes Tissue Uterus, Cervix and Bilateral Fallopian Tubes SURGICAL PATHOLOGY EXAM Andre Hurtado MD 5/1/2018  9:25 AM    B : left ovarian cyst Tissue Ovary, Left SURGICAL PATHOLOGY EXAM Andre Hurtado MD 5/1/2018  9:28 AM       Findings: 12 week size uterus.  Bilateral simple ovarian cysts  Upper abdominal adhesions

## 2018-05-01 NOTE — ANESTHESIA PREPROCEDURE EVALUATION
Anesthesia Evaluation     .             ROS/MED HX    ENT/Pulmonary:     (+), . Other pulmonary disease Sarcoidosis.   (-) tobacco use and sleep apnea   Neurologic:      (-) TIA, Other neuro hx and Dementia   Cardiovascular:        (-) hypertension, CAD, CHF, arrhythmias, pulmonary hypertension and dyslipidemia   METS/Exercise Tolerance:     Hematologic:        (-) anemia   Musculoskeletal:   (+) arthritis, , , -       GI/Hepatic:        (-) GERD, hiatal hernia and hepatitis   Renal/Genitourinary:      (-) renal disease   Endo:     (+) Chronic steroid usage for .   (-) Type I DM, Type II DM, thyroid disease, other endocrine disorder and obesity   Psychiatric:        (-) psychiatric history   Infectious Disease:  - neg infectious disease ROS       Malignancy:      - no malignancy   Other:    (+) H/O Chronic Pain,                   Physical Exam      Airway   Mallampati: II  TM distance: >3 FB  Neck ROM: full    Dental     Cardiovascular   Rhythm and rate: regular and normal  (-) no murmur    Pulmonary    breath sounds clear to auscultation    Other findings: Lab Test        05/01/18     12/02/15     05/09/13                       0641          1225          2111          WBC           --          6.2          6.6           HGB          12.6         12.5         13.0          MCV           --          89           88            PLT           --          286          290            Lab Test        05/09/13                       2111          NA           139           POTASSIUM    4.1           CHLORIDE     100           CO2          27            BUN          13            CR           0.80          ANIONGAP     11.5          KADI          9.1           GLC          111*                        Anesthesia Plan      History & Physical Review  History and physical reviewed and following examination; no interval change.    ASA Status:  2 .    NPO Status:  > 8 hours    Plan for General and ETT with Propofol induction.  Maintenance will be Balanced.    PONV prophylaxis:  Ondansetron (or other 5HT-3) and Dexamethasone or Solumedrol       Postoperative Care  Postoperative pain management:  IV analgesics and Oral pain medications.      Consents  Anesthetic plan, risks, benefits and alternatives discussed with:  Patient.  Use of blood products discussed: Yes.   Use of blood products discussed with Patient.  Consented to blood products.  .                          .

## 2018-05-01 NOTE — IP AVS SNAPSHOT
MRN:[id number]                      After Visit Summary   5/1/2018    Taisha Ordonez    MRN: [id number]           Thank you!     Thank you for choosing [facility] for your care. Our goal is always to provide you with excellent care. Hearing back from our patients is one way we can continue to improve our services. Please take a few minutes to complete the written survey that you may receive in the mail after you visit. If you would like to speak to someone directly about your visit please contact Patient Relations at [phone]. Thank you!          Patient Information     Date Of Birth          1970        About your hospital stay     You were admitted on:  May 1, 2018 You last received care in the:  [facility] PreOP/PostOP    You were discharged on:  May 1, 2018       Who to Call     For medical emergencies, please call 911.  For non-urgent questions about your medical care, please call your primary care provider or clinic, [phone]  For questions related to your surgery, please call your surgery clinic        Attending Provider     Provider Andre Cevrantes MD OB/Gyn       Primary Care Provider Office Phone # Fax #    [facility] [phone]      After Care Instructions     Discharge Instructions       Resume pre procedure diet            Discharge Instructions       Pelvic Rest. No tampons, douching or intercourse for  4  weeks.            Dressing       Keep dressing clean and dry, change as instructed by Provider or RN            No alcohol       NO ALCOHOL for 24 hours post procedure            No driving or operating machinery       No driving or operating machinery until day after procedure            No lifting       No lifting over 25 pounds and no strenuous physical activity.  For 4 weeks            Shower        Shower on Post-op day  2.   DO NOT take a bath                  Further instructions  from your care team       LAPAROSCOPY, HYSTEROSCOPY OR PELVISCOPY DISCHARGE INSTRUCTIONS    PLEASE RETURN TO THE CLINIC IN:  ____1 WEEK  ____2 WEEKS  ____4 WEEKS  ____6 WEEKS  MAKE THIS APPOINTMENT AFTER YOU GET HOME IF IT HAS NOT ALREADY BEEN SCHEDULED.    DO NOT DRIVE A CAR, DRINK ALCOHOL OR USE MACHINERY FOR THE NEXT 24 HOURS.  YOU SHOULD WAIT UNTIL YOU HAVE RECOVERED BEFORE MAKING ANY IMPORTANT DECISIONS.    PAIN  YOU MAY HAVE CRAMPS, SHOULDER PAIN OR A LOW BACKACHE FOR 24 TO 48 HOURS.  TYLENOL (ACETAMINOPHEN) OR MOTRIN (IBUPROFEN) MAY HELP, OR YOUR DOCTOR MAY GIVE YOU PAIN MEDICINE.  CALL YOUR DOCTOR IF PAIN CANNOT BE CONTROLLED.    BLEEDING OR VAGINAL DISCHARGE  YOU MAY HAVE SOME BLEEDING OR DISCHARGE FOR UP TO A WEEK OR LONGER.  DO NOT DOUCHE, USE TAMPONS OR HAVE SEX (INTERCOURSE) FOR ______DAYS.  CALL YOUR DOCTOR IF YOU SOAK MORE THAN ONE MAXI PAD (SANITARY [place]) PER HOUR, OR IF YOU PASS LARGE BLOOD CLOTS.    FEVER  YOU MAY HAVE A LOW FEVER FOR THE FIRST TWO DAYS.  CALL YOUR DOCTOR IF IT GOES OVER 101 DEGREES.    NAUSEA  IF YOU HAVE NAUSEA (FEEL SICK TO YOUR STOMACH), STAY IN BED.  TRY DRINKING A SMALL AMOUNT OF 7-UP, TEA OR SOUP.    SWOLLEN BELLY  IF YOUR ABDOMEN (BELLY AREA) FEELS FIRM OR SWOLLEN, CALL YOUR DOCTOR.    DIZZINESS AND WEAKNESS  YOU MAY FEEL DIZZY OR WEAK FOR A FEW DAYS.  IF SO, YOU SHOULD REST OFTEN, STAND UP SLOWLY AND USE CARE WHEN CLIMBING STAIRS.    DIET AND ACTIVITY  EAT LIGHT MEALS AND DRINK PLENTY OF FLUIDS FOR THE FIRST 24 HOURS (OR LONGER, IF YOU HAVE NAUSEA).  WAIT 5 DAYS BEFORE BATHING.  SHOWERS ARE OKAY.  MOST WOMEN CAN RETURN TO WORK AFTER 24 HOURS.  YOU MAY GO BACK TO YOUR OTHER ACTIVITIES AFTER YOUR PAIN GOES AWAY.      IF YOU HAVE STITCHES  YOU MAY REMOVE YOUR BANDAGE THE DAY AFTER TREATMENT.  YOUR DOCTOR WILL TELL YOU IF YOUR STITCHES NEED TO BE REMOVED.  SOME STITCHES DISSOLVE OVER TIME.         GENERAL ANESTHESIA OR SEDATION ADULT DISCHARGE INSTRUCTIONS   SPECIAL  "PRECAUTIONS FOR 24 HOURS AFTER SURGERY    IT IS NOT UNUSUAL TO FEEL LIGHT-HEADED OR FAINT, UP TO 24 HOURS AFTER SURGERY OR WHILE TAKING PAIN MEDICATION.  IF YOU HAVE THESE SYMPTOMS; SIT FOR A FEW MINUTES BEFORE STANDING AND HAVE SOMEONE ASSIST YOU WHEN YOU GET UP TO WALK OR USE THE BATHROOM.    YOU SHOULD REST AND RELAX FOR THE NEXT 24 HOURS AND YOU MUST MAKE ARRANGEMENTS TO HAVE SOMEONE STAY WITH YOU FOR AT LEAST 24 HOURS AFTER YOUR DISCHARGE.  AVOID HAZARDOUS AND STRENUOUS ACTIVITIES.  DO NOT MAKE IMPORTANT DECISIONS FOR 24 HOURS.    DO NOT DRIVE ANY VEHICLE OR OPERATE MECHANICAL EQUIPMENT FOR 24 HOURS FOLLOWING THE END OF YOUR SURGERY.  EVEN THOUGH YOU MAY FEEL NORMAL, YOUR REACTIONS MAY BE AFFECTED BY THE MEDICATION YOU HAVE RECEIVED.    DO NOT DRINK ALCOHOLIC BEVERAGES FOR 24 HOURS FOLLOWING YOUR SURGERY.    DRINK CLEAR LIQUIDS (APPLE JUICE, GINGER ALE, 7-UP, BROTH, ETC.).  PROGRESS TO YOUR REGULAR DIET AS YOU FEEL ABLE.    YOU MAY HAVE A DRY MOUTH, A SORE THROAT, MUSCLES ACHES OR TROUBLE SLEEPING.  THESE SHOULD GO AWAY AFTER 24 HOURS.    CALL YOUR DOCTOR FOR ANY OF THE FOLLOWING:  SIGNS OF INFECTION (FEVER, GROWING TENDERNESS AT THE SURGERY SITE, A LARGE AMOUNT OF DRAINAGE OR BLEEDING, SEVERE PAIN, FOUL-SMELLING DRAINAGE, REDNESS OR SWELLING.    IT HAS BEEN OVER 8 TO 10 HOURS SINCE SURGERY AND YOU ARE STILL NOT ABLE TO URINATE (PASS WATER).       Pending Results     Date and Time Order Name Status Description    5/1/2018 0927 Surgical pathology exam In process             Admission Information     Date & Time Provider Department Dept. Phone    5/1/2018 Andre Hurtado MD [facility] PreOP/PostOP [phone]      Your Vitals Were     Blood Pressure Temperature Respirations Height Weight Last Period    111/69 98.8  F (37.1  C) 16 1.6 m (5' 3\") 69.6 kg (153 lb 6.4 oz) 04/24/2018    Pulse Oximetry BMI (Body Mass Index)                100% 27.17 kg/m2          MyChart Information     MyChart lets you " "send messages to your doctor, view your test results, renew your prescriptions, schedule appointments and more. To sign up, go to [email or web address] . Click on \"Log in\" on the left side of the screen, which will take you to the Welcome page. Then click on \"Sign up Now\" on the right side of the page.     You will be asked to enter the access code listed below, as well as some personal information. Please follow the directions to create your username and password.     Your access code is: [email or web address]  Expires: 2018 10:21 AM     Your access code will  in 90 days. If you need help or a new code, please call your [place] clinic or [phone].        Care EveryWhere ID     This is your Care EveryWhere ID. This could be used by other organizations to access your [place] medical records  [id number]        Equal Access to Services     SADE PERRY : Kandice Treadwell, elyssa nelson, amos elena, lidia woody. So [facility] [phone].    ATENCIÓN: Si annamariela ben, tiene a cotto disposición servicios gratuitos de asistencia lingüística. Preetiyonis al [phone].    We comply with applicable federal civil rights laws and [place] laws. We do not discriminate on the basis of race, color, national origin, age, disability, sex, sexual orientation, or gender identity.               Review of your medicines      START taking        Dose / Directions    oxyCODONE IR 5 MG tablet   Commonly known as:  ROXICODONE   Used for:  S/P hysterectomy        Dose:  5-10 mg   Take 1-2 tablets (5-10 mg) by mouth every 3 hours as needed for pain or other (Moderate to Severe)   Quantity:  30 tablet   Refills:  0         CONTINUE these medicines which have NOT CHANGED        Dose / Directions    PREDNISONE PO   Indication:  sarcoidosis        Dose:  5 mg   Take 5 mg by mouth as needed (dosage varies based on flare ups with sarcoidosis)   Refills:  0            Where to get your " medicines      Some of these will need a paper prescription and others can be bought over the counter. Ask your nurse if you have questions.     Bring a paper prescription for each of these medications     oxyCODONE IR 5 MG tablet                Protect others around you: Learn how to safely use, store and throw away your medicines at www.disposemymeds.org.        Information about OPIOIDS     PRESCRIPTION OPIOIDS: WHAT YOU NEED TO KNOW   You have a prescription for an opioid (narcotic) pain medicine. Opioids can cause addiction. If you have a history of chemical dependency of any type, you are at a higher risk of becoming addicted to opioids. Only take this medicine after all other options have been tried. Take it for as short a time and as few doses as possible.     Do not:    Drive. If you drive while taking these medicines, you could be arrested for driving under the influence (DUI).    Operate heavy machinery    Do any other dangerous activities while taking these medicines.     Drink any alcohol while taking these medicines.      Take with any other medicines that contain acetaminophen. Read all labels carefully. Look for the word  acetaminophen  or  Tylenol.  Ask your pharmacist if you have questions or are unsure.    Store your pills in a secure place, locked if possible. We will not replace any lost or stolen medicine. If you don t finish your medicine, please throw away (dispose) as directed by your pharmacist. The [place] Pollution Control Agency has more information about safe disposal: https://www.pca.[facility].[place].us/living-green/managing-unwanted-medications    All opioids tend to cause constipation. Drink plenty of water and eat foods that have a lot of fiber, such as fruits, vegetables, prune juice, apple juice and high-fiber cereal. Take a laxative (Miralax, milk of magnesia, Colace, Senna) if you don t move your bowels at least every other day.              Medication List: This is a list of all your  medications and when to take them. Check marks below indicate your daily home schedule. Keep this list as a reference.      Medications           Morning Afternoon Evening Bedtime As Needed    oxyCODONE IR 5 MG tablet   Commonly known as:  ROXICODONE   Take 1-2 tablets (5-10 mg) by mouth every 3 hours as needed for pain or other (Moderate to Severe)   Last time this was given:  5 mg on 5/1/2018 11:45 AM                                PREDNISONE PO   Take 5 mg by mouth as needed (dosage varies based on flare ups with sarcoidosis)                                          More Information        Reasons for Pelvic Laparoscopy  Pelvic laparoscopy lets your doctor directly view the reproductive organs. He or she can see what s causing problems. Problems may include pain, bleeding, or trouble getting pregnant. Treatment may happen as part of the same procedure, depending on the cause of the problem. You'll discuss this with your doctor before the procedure. Conditions often diagnosed and treated by laparoscopy are shown below.        Tubal pregnancy. This occurs when an embryo implants in a fallopian tube. Untreated, the tube can rupture and bleed.    A fibroid (lump of uterine muscle tissue). This can cause pain and bleeding.    Endometriosis (growth of uterine lining tissue outside the uterus). This can lead to pain, bleeding, and trouble getting pregnant.    A cyst (fluid-filled sac) or tumor (abnormal growth). These can form on the ovary. They can cause pain and other health problems.    Adhesions (scar tissue). These can cause pain and trouble getting pregnant.    A blocked or damaged fallopian tube. This can cause trouble getting pregnant.    Sterilization. To provide permanent birth control.    Hysterectomy. Removing the uterus with or without removing the ovaries or fallopian tubes.    Pelvic organ prolapse. This is when the female organs drop into or out of the vagina.    Incontinence. This is when urine leaks  unintentionally.    Some types of cancer.  Date Last Reviewed: 10/1/2017    7469-4612 [employer]. [address] [place] [address]. All rights reserved. This information is not intended as a substitute for professional medical care. Always follow your healthcare professional's instructions.                Understanding Ovarian Cystectomy    An ovarian cystectomy is a type of surgery. It removes a cyst from your ovaries. A cyst is a sac full of fluid. You have two ovaries, one on each side of your uterus. The ovaries make your eggs (ova). They also make the hormone estrogen.  How to say it  [id number]   Why ovarian cystectomy is done  This procedure is done to remove a cyst on an ovary. It s usually done only if the cyst is large or painful. Many women have cysts on their ovaries. They are often benign. But they can be cancerous.  How ovarian cystectomy is done  This procedure is often done in a hospital. You may need to spend a few days in the hospital after the cyst is removed. During the procedure:    You are given medicine to make you fall asleep. You won t feel any pain.    The surgeon makes a cut (incision) in your belly (abdomen) to reach your reproductive organs.    The surgeon puts a clamp on the ovary to hold it still.    The surgeon carefully cuts into the ovary, revealing the cyst.    The surgeon may drain the cyst. It is then cut away from the ovary.    The cyst may be sent to a lab to check for disease, such as cancer.    The surgeon stops any bleeding from the ovary. He or she then closes the cut in your belly.  Risks of ovarian cystectomy    Bleeding    Damage to an ovary    Infection    Injury to the bladder  Date Last Reviewed: 6/1/2016 2000-2017 [employer]. [address] [place] [address]. All rights reserved. This information is not intended as a substitute for professional medical care. Always follow your healthcare professional's  instructions.

## 2018-05-01 NOTE — DISCHARGE INSTRUCTIONS
LAPAROSCOPY, HYSTEROSCOPY OR PELVISCOPY DISCHARGE INSTRUCTIONS    PLEASE RETURN TO THE CLINIC IN:  ____1 WEEK  ____2 WEEKS  ____4 WEEKS  ____6 WEEKS  MAKE THIS APPOINTMENT AFTER YOU GET HOME IF IT HAS NOT ALREADY BEEN SCHEDULED.    DO NOT DRIVE A CAR, DRINK ALCOHOL OR USE MACHINERY FOR THE NEXT 24 HOURS.  YOU SHOULD WAIT UNTIL YOU HAVE RECOVERED BEFORE MAKING ANY IMPORTANT DECISIONS.    PAIN  YOU MAY HAVE CRAMPS, SHOULDER PAIN OR A LOW BACKACHE FOR 24 TO 48 HOURS.  TYLENOL (ACETAMINOPHEN) OR MOTRIN (IBUPROFEN) MAY HELP, OR YOUR DOCTOR MAY GIVE YOU PAIN MEDICINE.  CALL YOUR DOCTOR IF PAIN CANNOT BE CONTROLLED.    BLEEDING OR VAGINAL DISCHARGE  YOU MAY HAVE SOME BLEEDING OR DISCHARGE FOR UP TO A WEEK OR LONGER.  DO NOT DOUCHE, USE TAMPONS OR HAVE SEX (INTERCOURSE) FOR ______DAYS.  CALL YOUR DOCTOR IF YOU SOAK MORE THAN ONE MAXI PAD (SANITARY [place]) PER HOUR, OR IF YOU PASS LARGE BLOOD CLOTS.    FEVER  YOU MAY HAVE A LOW FEVER FOR THE FIRST TWO DAYS.  CALL YOUR DOCTOR IF IT GOES OVER 101 DEGREES.    NAUSEA  IF YOU HAVE NAUSEA (FEEL SICK TO YOUR STOMACH), STAY IN BED.  TRY DRINKING A SMALL AMOUNT OF 7-UP, TEA OR SOUP.    SWOLLEN BELLY  IF YOUR ABDOMEN (BELLY AREA) FEELS FIRM OR SWOLLEN, CALL YOUR DOCTOR.    DIZZINESS AND WEAKNESS  YOU MAY FEEL DIZZY OR WEAK FOR A FEW DAYS.  IF SO, YOU SHOULD REST OFTEN, STAND UP SLOWLY AND USE CARE WHEN CLIMBING STAIRS.    DIET AND ACTIVITY  EAT LIGHT MEALS AND DRINK PLENTY OF FLUIDS FOR THE FIRST 24 HOURS (OR LONGER, IF YOU HAVE NAUSEA).  WAIT 5 DAYS BEFORE BATHING.  SHOWERS ARE OKAY.  MOST WOMEN CAN RETURN TO WORK AFTER 24 HOURS.  YOU MAY GO BACK TO YOUR OTHER ACTIVITIES AFTER YOUR PAIN GOES AWAY.      IF YOU HAVE STITCHES  YOU MAY REMOVE YOUR BANDAGE THE DAY AFTER TREATMENT.  YOUR DOCTOR WILL TELL YOU IF YOUR STITCHES NEED TO BE REMOVED.  SOME STITCHES DISSOLVE OVER TIME.         GENERAL ANESTHESIA OR SEDATION ADULT DISCHARGE INSTRUCTIONS   SPECIAL PRECAUTIONS FOR 24 HOURS AFTER  SURGERY    IT IS NOT UNUSUAL TO FEEL LIGHT-HEADED OR FAINT, UP TO 24 HOURS AFTER SURGERY OR WHILE TAKING PAIN MEDICATION.  IF YOU HAVE THESE SYMPTOMS; SIT FOR A FEW MINUTES BEFORE STANDING AND HAVE SOMEONE ASSIST YOU WHEN YOU GET UP TO WALK OR USE THE BATHROOM.    YOU SHOULD REST AND RELAX FOR THE NEXT 24 HOURS AND YOU MUST MAKE ARRANGEMENTS TO HAVE SOMEONE STAY WITH YOU FOR AT LEAST 24 HOURS AFTER YOUR DISCHARGE.  AVOID HAZARDOUS AND STRENUOUS ACTIVITIES.  DO NOT MAKE IMPORTANT DECISIONS FOR 24 HOURS.    DO NOT DRIVE ANY VEHICLE OR OPERATE MECHANICAL EQUIPMENT FOR 24 HOURS FOLLOWING THE END OF YOUR SURGERY.  EVEN THOUGH YOU MAY FEEL NORMAL, YOUR REACTIONS MAY BE AFFECTED BY THE MEDICATION YOU HAVE RECEIVED.    DO NOT DRINK ALCOHOLIC BEVERAGES FOR 24 HOURS FOLLOWING YOUR SURGERY.    DRINK CLEAR LIQUIDS (APPLE JUICE, GINGER ALE, 7-UP, BROTH, ETC.).  PROGRESS TO YOUR REGULAR DIET AS YOU FEEL ABLE.    YOU MAY HAVE A DRY MOUTH, A SORE THROAT, MUSCLES ACHES OR TROUBLE SLEEPING.  THESE SHOULD GO AWAY AFTER 24 HOURS.    CALL YOUR DOCTOR FOR ANY OF THE FOLLOWING:  SIGNS OF INFECTION (FEVER, GROWING TENDERNESS AT THE SURGERY SITE, A LARGE AMOUNT OF DRAINAGE OR BLEEDING, SEVERE PAIN, FOUL-SMELLING DRAINAGE, REDNESS OR SWELLING.    IT HAS BEEN OVER 8 TO 10 HOURS SINCE SURGERY AND YOU ARE STILL NOT ABLE TO URINATE (PASS WATER).

## 2018-05-01 NOTE — IP AVS SNAPSHOT
[facility] PreOP/PostOP    [address]    [facility] [address]    Phone:  [phone]    Fax:  [phone]                                       After Visit Summary   5/1/2018    Taisha Ordonez    MRN: [id number]           After Visit Summary Signature Page     I have received my discharge instructions, and my questions have been answered. I have discussed any challenges I see with this plan with the nurse or doctor.    ..........................................................................................................................................  Patient/Patient Representative Signature      ..........................................................................................................................................  Patient Representative Print Name and Relationship to Patient    ..................................................               ................................................  Date                                            Time    ..........................................................................................................................................  Reviewed by Signature/Title    ...................................................              ..............................................  Date                                                            Time

## 2018-05-03 LAB — COPATH REPORT: NORMAL

## 2021-05-09 ENCOUNTER — HEALTH MAINTENANCE LETTER (OUTPATIENT)
Age: 51
End: 2021-05-09

## 2021-10-24 ENCOUNTER — HEALTH MAINTENANCE LETTER (OUTPATIENT)
Age: 51
End: 2021-10-24

## 2022-06-05 ENCOUNTER — HEALTH MAINTENANCE LETTER (OUTPATIENT)
Age: 52
End: 2022-06-05

## 2022-10-16 ENCOUNTER — HEALTH MAINTENANCE LETTER (OUTPATIENT)
Age: 52
End: 2022-10-16

## 2023-06-17 ENCOUNTER — HEALTH MAINTENANCE LETTER (OUTPATIENT)
Age: 53
End: 2023-06-17

## 2024-09-19 ENCOUNTER — APPOINTMENT (OUTPATIENT)
Dept: GENERAL RADIOLOGY | Facility: CLINIC | Age: 54
End: 2024-09-19
Attending: EMERGENCY MEDICINE
Payer: COMMERCIAL

## 2024-09-19 ENCOUNTER — HOSPITAL ENCOUNTER (EMERGENCY)
Facility: CLINIC | Age: 54
Discharge: HOME | End: 2024-09-19
Attending: PHYSICIAN ASSISTANT | Admitting: PHYSICIAN ASSISTANT
Payer: COMMERCIAL

## 2024-09-19 VITALS
TEMPERATURE: 98.2 F | DIASTOLIC BLOOD PRESSURE: 95 MMHG | WEIGHT: 173 LBS | BODY MASS INDEX: 30.65 KG/M2 | OXYGEN SATURATION: 98 % | HEIGHT: 63 IN | HEART RATE: 105 BPM | SYSTOLIC BLOOD PRESSURE: 118 MMHG | RESPIRATION RATE: 20 BRPM

## 2024-09-19 DIAGNOSIS — S82.892A ANKLE FRACTURE, LEFT, CLOSED, INITIAL ENCOUNTER: ICD-10-CM

## 2024-09-19 PROCEDURE — 99284 EMERGENCY DEPT VISIT MOD MDM: CPT

## 2024-09-19 PROCEDURE — 73610 X-RAY EXAM OF ANKLE: CPT | Mod: LT

## 2024-09-19 PROCEDURE — 73590 X-RAY EXAM OF LOWER LEG: CPT | Mod: LT

## 2024-09-19 PROCEDURE — 250N000013 HC RX MED GY IP 250 OP 250 PS 637: Performed by: EMERGENCY MEDICINE

## 2024-09-19 PROCEDURE — 27786 TREATMENT OF ANKLE FRACTURE: CPT | Mod: LT

## 2024-09-19 PROCEDURE — 73630 X-RAY EXAM OF FOOT: CPT | Mod: LT

## 2024-09-19 RX ORDER — IBUPROFEN 600 MG/1
600 TABLET, FILM COATED ORAL ONCE
Status: COMPLETED | OUTPATIENT
Start: 2024-09-19 | End: 2024-09-19

## 2024-09-19 RX ORDER — OXYCODONE HYDROCHLORIDE 5 MG/1
5 TABLET ORAL EVERY 6 HOURS PRN
Qty: 12 TABLET | Refills: 0 | Status: SHIPPED | OUTPATIENT
Start: 2024-09-19 | End: 2024-09-22

## 2024-09-19 RX ORDER — ACETAMINOPHEN 325 MG/1
975 TABLET ORAL ONCE
Status: COMPLETED | OUTPATIENT
Start: 2024-09-19 | End: 2024-09-19

## 2024-09-19 RX ADMIN — ACETAMINOPHEN 325MG 650 MG: 325 TABLET ORAL at 11:34

## 2024-09-19 ASSESSMENT — COLUMBIA-SUICIDE SEVERITY RATING SCALE - C-SSRS
6. HAVE YOU EVER DONE ANYTHING, STARTED TO DO ANYTHING, OR PREPARED TO DO ANYTHING TO END YOUR LIFE?: NO
1. IN THE PAST MONTH, HAVE YOU WISHED YOU WERE DEAD OR WISHED YOU COULD GO TO SLEEP AND NOT WAKE UP?: NO
2. HAVE YOU ACTUALLY HAD ANY THOUGHTS OF KILLING YOURSELF IN THE PAST MONTH?: NO

## 2024-09-19 ASSESSMENT — ACTIVITIES OF DAILY LIVING (ADL)
ADLS_ACUITY_SCORE: 35
ADLS_ACUITY_SCORE: 35

## 2024-09-19 NOTE — ED TRIAGE NOTES
"Pt presents for evaluation after tripping over her dog. Pt was pulling a tub down the steps, went down about 4 steps, landing on her buttocks. Twisted her left ankle in the process. [place] a \"cracking\" sound. Has some slight discoloration at baseline of the feet, worse on the left. DP pulse present and palpable +2.        "

## 2024-09-19 NOTE — ED PROVIDER NOTES
[address] 1966, 60 y o  male MRN: [id number]  Unit/Bed#: ED 17 Encounter: [id number]  Primary Care Provider: NICOLE Gonzalez   Date and time admitted to hospital: 3/30/2022 11:52 AM    Assessment and Plan  * Acute on chronic combined systolic and diastolic congestive heart failure [facility])  Assessment & Plan  Wt Readings from Last 3 Encounters:   03/24/22 62 9 kg (138 lb 9 6 oz)   03/22/22 60 kg (132 lb 4 4 oz)   03/17/22 65 kg (143 lb 4 8 oz)     Patient is admitted for CHF exacerbation  BNP on admission is   Lab Results   Component Value Date    NTBNP 7,674 (H) 03/30/2022    NTBNP 12,025 (H) 03/15/2022    NTBNP 31,172 (H) 01/28/2022       Will plan IV diuresis with plan for cardiology consulation if applicable  CHF type is combined systolic and diastolic  Last Echocardiogram shows an EF of 43%, Diastolic HF  Inderterminate  1/2022  Trend BMP with diuresis and replete potassium if applicable   Will trend cardiac enzymes  Fluid restriction and low sodium diet placed  Monitor Sodium levels - likely low in the setting of hypervolemia  Chest Xray shows:  Vascular congestion, large right and moderate left pleural effusion on CT of the chest from 03/29/2022  Troponin reviewed and in the medical chart  Patient will need heart failure follow up within 1 week of discharge          Chronic respiratory failure with hypoxia [facility])  Assessment & Plan  History of chronic respiratory failure with hypoxia after having COVID-19  Initially admitted in January of 2022 in respiratory distress, was intubated, subsequently left against medical advice  Discharged on 2 L of oxygen as outpatient pulmonary follow-up  He is chronically on 2-4 L of oxygen   Was recently seen by Pulmonary with CT of the chest demonstrating fibrotic lung disease    Medically noncompliant  Assessment & Plan  History of medication noncompliance- likely etiology of heart ED APC SUPERVISION NOTE:   I evaluated this patient in conjunction with Reece Gerardo PA-C. I have participated in the care of the patient and personally performed key elements of the history, exam, and medical decision making.      HPI:   Taisha Ordonez is a 54 year old female who presents with left ankle pain. Patient notes that she was pulling a tub down her steps and was temporarily obstructed in her path down and subsequently fell over down four steps. She states that she twisted her left ankle in the process and heard a cracking noise. She endorses minimal left shin pain. She denies anticoagulation. She denies any pain above her waist. She denies loss of consciousness. She denies sustaining any head trauma.    Independent Historian:   None    Review of External Notes: None     EXAM:   Constitutional: Vital signs reviewed.  Pleasant.  HEENT: Moist mucous membranes  Cardiovascular: Regular rate and rhythm  Pulmonary/Chest: Breathing comfortably on room air.  No audible wheezing  Musculoskeletal/Extremities: Tenderness and swelling to the left lateral ankle.  No tenderness over the medial ankle, the foot, the calf or the knee.  She has strong distal pulses and normal sensation.  Neurological: Alert.  No focal deficits.  Endo: No pitting edema  Skin: No visible rash.  Psychiatric: Pleasant.     Independent Interpretation (X-rays, CTs, rhythm strip):  Reviewed patient's left ankle and tibia/fibula XR, fracture of distal fibula.  There is a very slight bony deformity over the right lateral tibia.  Mortise is intact.    Consultations/Discussion of Management or Tests:  None     MEDICAL DECISION MAKING/ASSESSMENT AND PLAN:   Patient presents after falling as detailed above.  She has an oblique fracture of the distal fibula into the lateral corner of the ankle mortise.  There is a very slight irregularity along the medial malleolus which could be a very slight fracture but the mortise again is intact.  Patient  failure    Hyponatremia  Assessment & Plan  Likely in the setting of volume overload  Hyponatremia workup underway  Continue fluid restriction  Continue diuresis  Consider Samsca/Tolvaptan  Recent Labs     03/30/22  1216   SODIUM 126*         Substance use  Assessment & Plan  History of THC and cocaine use in the past - denies currently    Biventricular ICD (implantable cardioverter-defibrillator) in place  Assessment & Plan  History of ventricular fibrillation and ventricular tachycardia  He is status post dual chamber biventricular Medtronic ICD in place  Maintained on amiodarone  Recent lead extraction  and addition of a new atrial lead  Also had  generator change due to low battery life in March of 2022    S/P AVR (aortic valve replacement)  Assessment & Plan  History of rheumatic heart disease and is status post mechanical aortic valve replacement in the 1980s  Patient on chronic anticoagulation with Coumadin  INR is therapeutic  Recent Labs     03/30/22  1216   INR 2 49*         Coronary artery disease involving native coronary artery of native heart without angina pectoris  Assessment & Plan  Patient has a history of coronary artery disease with previous PCI and bare metal stent placement in LAD and circumflex in 2014  Continue medical therapy with aspirin, beta-blocker, statin  Patient has a prior history of cocaine use, UDS Pending      Code Status:  Full code    VTE Prophylaxis: Warfarin (Coumadin)  / sequential compression device     POLST: There is no POLST form on file for this patient (pre-hospital)  Discussion with family:  Brother at bedside    Anticipated Length of Stay:  Patient will be admitted on an Inpatient basis with an anticipated length of stay of  greater than 2 midnights  Justification for Hospital Stay: Acute on chronic combined systolic and diastolic congestive heart failure ([address] )     Total Time for Visit, including Counseling / Coordination of Care: 45 minutes    Greater than 50% of was given Tylenol here for pain control and she was placed in a left lower extremity splint.  She will follow-up with orthopedics.  I suspect it is highly likely they will transition her to a cam boot.  But given that the lateral aspect of this fracture does connect with the mortise we felt nonweightbearing until she follows up with orthopedics would be prudent.  Patient is comfortable this plan.  She was discharged home with close orthopedic follow-up.     DIAGNOSIS:   Distal left fibular fracture    Scribe Disclosure:  ZEB, Pierre Pepe, am serving as a scribe at 12:17 PM on 9/19/2024 to document services personally performed by Thomas Lynn MD based on my observations and the provider's statements to me.     9/19/2024  [facility] EMERGENCY DEPT     Thomas Lynn MD  09/19/24 0398     this total time spent on direct patient counseling and coordination of care  Chief Complaint:     Chief Complaint   Patient presents with    Chest Pain     Pt reports left sided chest pain that began last night  Pt reports it is on his left side and does not radiate  Pt reports exrtreme sob  Pt is on home 02  History of Present Illness:    Paolo Dick is a 60 y o  male who presents with shortness of breath difficulty breathing  The patient has history of systolic and diastolic congestive heart failure  He has history of medication noncompliance  Patient's most recent echocardiogram demonstrates ejection fraction 20%  He does have history of coronary disease with stenting to the LAD and left circumflex in 2014 with bare metal stent  His medical history is also notable for COVID fibrosis  He has history of medication noncompliance as well as multiple admissions for heart failure exacerbation  His most recent hospitalization was due the need for he atrial lead replacement as well as generator change  He was most recently discharged on 03/22/2022 for heart failure again  At  time my evaluation the patient reports having chest discomfort, he feels feeling short of breath, also has lower extremity edema  He most recently had a cardiology evaluation by Dr Divya Liang on 3/24/2022, his most recent weight at discharge was 132 28 lbs    Review of Systems:    A complete and comprehensive 14 point organ system review was performed and all other systems are negative other than stated above in the HPI    Past Medical and Surgical History:     Past Medical History:   Diagnosis Date    AICD (automatic cardioverter/defibrillator) present     medtronic     CAD (coronary artery disease)     [facility]     Cardiac disease     Cardiomyopathy ([facility] )     mixed    predominently nonischemic    Colonic mass     Coronary artery disease     History of ventricular fibrillation 9/2/2021    History of ventricular tachycardia 5/25/2021    Hyperlipidemia     Hypertension     Irregular heart beat     Myocardial infarction [facility])     2014 and 2015    Pneumonia 1/26/2022    Rectal bleeding     S/P AVR (aortic valve replacement)     mechanical    Wears glasses        Past Surgical History:   Procedure Laterality Date    AORTIC VALVE REPLACEMENT      APPENDECTOMY      CARDIAC DEFIBRILLATOR PLACEMENT      CARDIAC ELECTROPHYSIOLOGY PROCEDURE N/A 3/21/2022    Procedure: Cardiac lead revision;  Surgeon: Susan Peoples MD;  [facility]; Service: Cardiology    CARDIAC ELECTROPHYSIOLOGY PROCEDURE N/A 3/21/2022    Procedure: Cardiac biv icd generator change;  Surgeon: Susan Peoples MD;  [facility]; Service: Cardiology    COLECTOMY MADELIN Right     Last Assessed: 6/7/2016    COLECTOMY LAPAROSCOPIC      COLON SURGERY Right     colectomy    CORONARY STENT PLACEMENT      INSERT / REPLACE / REMOVE PACEMAKER      MITRAL VALVE REPAIR      MITRAL VALVE REPAIR      MOUTH SURGERY      WA COLONOSCOPY FLX DX W/COLLJ SPEC WHEN PFRMD N/A 1/15/2018    Procedure: COLONOSCOPY;  Surgeon: Steen Carrel, MD;  Location: [facility]; Service: General    WA LAP,SURG,COLECTOMY, PARTIAL, W/ANAST N/A 6/2/2016    Procedure: RESECTION COLON RIGHT LAPAROSCOPIC HAND-ASSISTED;  Surgeon: Steen Carrel, MD;  Location: [facility];  Service: General       Meds/Allergies:    Prior to Admission medications    Medication Sig Start Date End Date Taking?  Authorizing Provider   albuterol (5 mg/mL) 0 5 % nebulizer solution Take 0 5 mL (2 5 mg total) by nebulization every 4 (four) hours as needed for shortness of breath 3/22/22   Moni Pastor MD   amiodarone 200 mg tablet Take 1 tablet (200 mg total) by mouth daily with breakfast 3/23/22   Moni Pastor MD   aspirin 81 MG tablet Take 81 mg by mouth daily      Historical Provider, MD   atorvastatin (LIPITOR) 80 mg tablet take 1 tablet by mouth once daily 3/14/22 Mandy Olivares DO   eplerenone (INSPRA) 25 mg tablet take 1 tablet by mouth once daily 1/31/22   Mandy Olivares DO   fluticasone-vilanterol [facility] ELLIPTA) 100-25 mcg/inh inhaler Inhale 1 puff daily Rinse mouth after use  3/23/22   Altaf Nolan MD   furosemide (LASIX) 20 mg tablet Take 1 tablet (20 mg total) by mouth 2 (two) times a day 3/1/22 6/29/22  Mandy Olivares DO   lisinopril (ZESTRIL) 10 mg tablet Take 1 tablet (10 mg total) by mouth daily at bedtime 3/1/22 6/29/22  Mandy Olivares DO   lisinopril (ZESTRIL) 40 mg tablet Take 40 mg by mouth daily at bedtime 3/4/22   Historical Provider, MD   metoprolol succinate (TOPROL-XL) 25 mg 24 hr tablet Take 3 tablets (75 mg total) by mouth daily 3/23/22   Altaf Nolan MD   warfarin (COUMADIN) 2 5 mg tablet Take 1 tablet (2 5 mg total) by mouth daily 3/22/22 4/21/22  Altaf Nolan MD     I have reviewed home medications with patient personally      Allergies: No Known Allergies    Social History:     Marital Status: /Civil Union   Occupation:  Disabled    Substance Use History:   Social History     Substance and Sexual Activity   Alcohol Use Not Currently     Social History     Tobacco Use   Smoking Status Never Smoker   Smokeless Tobacco Never Used     Social History     Substance and Sexual Activity   Drug Use Not Currently    Types: Marijuana       Family History:    Family History   Problem Relation Age of Onset    Diabetes Mother     Hypertension Mother     Valvular heart disease Mother     Valvular heart disease Father     Alcohol abuse Father        Physical Exam:     Vitals:   Blood Pressure: (!) 154/101 (03/30/22 1158)  Pulse: 104 (03/30/22 1158)  Temperature: (!) 97 4 °F (36 3 °C) (03/30/22 1202)  Temp Source: Oral (03/30/22 1202)  Respirations: 22 (03/30/22 1158)  SpO2: 100 % (03/30/22 1158)      General:  Appears older than stated age  HEENT: atraumatic, PERRLA, moist mucosa, normal pharynx, normal tonsils and adenoids, normal tongue, no fluid in sinuses  Neck: Trachea midline, no carotid bruit, no masses  Respiratory: normal chest wall expansion, CTA B, no r/r/w, no rubs  Cardiovascular:  Regular rate and rhythm, 3/6 systolic ejection murmur heard, plus S1 plus S2, plus S3, 8 cm of JV  Abdomen: Soft, non-tender, non-distended, normal bowel sounds in all quadrants, no hepatosplenomegaly, no tympany  Rectal: deferred  Musculoskeletal: normal ROM in upper and lower extremities  Integumentary:  2+ edema  Heme/Lymph: no lymphadenopathy, no bruises  Neurological: Cranial Nerves II-XII grossly intact  Psychiatric: cooperative with normal mood, affect, and cognition    Additional Data:     Lab Results: I have personally reviewed pertinent reports  Results from last 7 days   Lab Units 03/30/22  1216   WBC Thousand/uL 10 22*   HEMOGLOBIN g/dL 11 1*   HEMATOCRIT % 34 9*   PLATELETS Thousands/uL 319   NEUTROS PCT % 80*   LYMPHS PCT % 9*   MONOS PCT % 9   EOS PCT % 0     Results from last 7 days   Lab Units 03/30/22  1216   SODIUM mmol/L 126*   POTASSIUM mmol/L 4 2   CHLORIDE mmol/L 88*   CO2 mmol/L 29   BUN mg/dL 9   CREATININE mg/dL 1 11   ANION GAP mmol/L 9   CALCIUM mg/dL 8 9   ALBUMIN g/dL 3 3*   TOTAL BILIRUBIN mg/dL 1 39*   ALK PHOS U/L 117*   ALT U/L 18   AST U/L 24   GLUCOSE RANDOM mg/dL 170*     Results from last 7 days   Lab Units 03/30/22  1216   INR  2 49*             Results from last 7 days   Lab Units 03/30/22  1216   LACTIC ACID mmol/L 4 3*     Results from last 7 days   Lab Units 03/30/22  1216   HS TNI 0HR ng/L 34       Imaging: I have personally reviewed pertinent reports  XR chest 1 view portable   ED Interpretation by Paloma Figueroa DO (03/30 8764)   chf          EKG, Pathology, and Other Studies Reviewed on Admission:   · Chest x-ray significant vascular congestion, bilateral pleural effusions    Allscripts / Epic Records Reviewed: Yes     ** Please Note: This note was completed in part utilizing [employer] Direct Software    Grammatical errors, random word insertions, spelling mistakes, and incomplete sentences may be an occasional consequence of this system secondary to software limitations, ambient noise, and hardware issues  If you have any questions or concerns about the content, text, or information contained within the body of this dictation, please contact the provider for clarification  **

## 2024-09-19 NOTE — ED PROVIDER NOTES
"  Emergency Department Note      History of Present Illness     Chief Complaint   Fall and Ankle Pain      HPI   Taisha Ordonez is a 54 year old female who presents with left ankle pain. Patient notes that she was pulling a tub down her steps and was temporarily obstructed in her path down and subsequently fell over down four steps. She states that she twisted her left ankle in the process and heard a cracking noise. She endorses minimal left shin pain. She denies anticoagulation. She denies any pain above her waist. She denies loss of consciousness. She denies sustaining any head trauma.    Independent Historian   None    Review of External Notes     Past Medical History     Medical History and Problem List   Chronic infection  Complication of anesthesia  History of blood transfusion  Other chronic pain      Medications   Prednisone    Surgical History   Abdomen surgery  Bronchoscopy  Dilation and curettage   Tubal ligation  Hysterectomy   Salpingo-oophorectomy    Physical Exam     Patient Vitals for the past 24 hrs:   BP Temp Temp src Pulse Resp SpO2 Height Weight   09/19/24 1129 (!) 118/95 98.2  F (36.8  C) Oral 105 20 98 % 1.6 m (5' 3\") 78.5 kg (173 lb)     Physical Exam  Vitals and nursing note reviewed.   Constitutional:       Appearance: She is not diaphoretic.   HENT:      Head: Atraumatic.   Eyes:      General: No scleral icterus.     Conjunctiva/sclera: Conjunctivae normal.   Cardiovascular:      Rate and Rhythm: Normal rate and regular rhythm.      Heart sounds: No murmur heard.     No friction rub. No gallop.   Pulmonary:      Effort: Pulmonary effort is normal. No respiratory distress.      Breath sounds: Normal breath sounds. No stridor. No wheezing, rhonchi or rales.   Musculoskeletal:      Right hip: No deformity. Normal range of motion.      Left hip: No deformity. Normal range of motion.      Right upper leg: No swelling or deformity.      Left upper leg: No swelling or deformity.      Right " knee: No swelling or deformity. Normal range of motion. No tenderness.      Left knee: No swelling or deformity. Normal range of motion. No tenderness.      Right lower leg: No swelling, deformity or tenderness.      Left lower leg: Tenderness present. No swelling or deformity.      Right ankle: No swelling or deformity. No tenderness. Normal range of motion. Normal pulse.      Left ankle: Swelling present. Tenderness present over the lateral malleolus and base of 5th metatarsal. No medial malleolus tenderness. Decreased range of motion. Normal pulse.      Right foot: Normal range of motion and normal capillary refill. No swelling, deformity or tenderness. Normal pulse.      Left foot: Normal range of motion and normal capillary refill. Tenderness present. No swelling or deformity. Normal pulse.   Skin:     Capillary Refill: Capillary refill takes less than 2 seconds.   Neurological:      Mental Status: She is alert and oriented to person, place, and time. Mental status is at baseline.   Psychiatric:         Mood and Affect: Mood normal.         Behavior: Behavior normal.         Thought Content: Thought content normal.         Diagnostics     Lab Results   Labs Ordered and Resulted from Time of ED Arrival to Time of ED Departure - No data to display    Imaging   XR Tibia & Fibula Left 2 Views   Final Result   IMPRESSION: Oblique fracture of the distal fibula extends into the   lateral corner of the ankle mortise. The tibia and fibula are   otherwise negative. There is slight periosteal irregularity along its   superficial margin of the medial malleolus which could represent   additional fracture but this is seen on only one view. Soft tissue   swelling  about the ankle. No disruption of the ankle mortise. The   left foot is negative for fracture. Mild degenerative change at the   first MTP joint .      QUINCY CHAN MD            SYSTEM ID:  [id number]      XR Ankle Left G/E 3 Views   Final Result   IMPRESSION:  Oblique fracture of the distal fibula extends into the   lateral corner of the ankle mortise. The tibia and fibula are   otherwise negative. There is slight periosteal irregularity along its   superficial margin of the medial malleolus which could represent   additional fracture but this is seen on only one view. Soft tissue   swelling  about the ankle. No disruption of the ankle mortise. The   left foot is negative for fracture. Mild degenerative change at the   first MTP joint .      QUINCY CHAN MD            SYSTEM ID:  [id number]      XR Foot Left G/E 3 Views   Final Result   IMPRESSION: Oblique fracture of the distal fibula extends into the   lateral corner of the ankle mortise. The tibia and fibula are   otherwise negative. There is slight periosteal irregularity along its   superficial margin of the medial malleolus which could represent   additional fracture but this is seen on only one view. Soft tissue   swelling  about the ankle. No disruption of the ankle mortise. The   left foot is negative for fracture. Mild degenerative change at the   first MTP joint .      QUINCY CHAN MD            SYSTEM ID:  [id number]        Independent Interpretation   X-ray Left Tib/Fib shows distal fibula fracture, No dislocation  X-ray Left Ankle shows Distal fibula fracture, no dislocation  X-ray Left foot shows no fracture or dislocation    ED Course      Medications Administered   Medications   acetaminophen (TYLENOL) tablet 975 mg (650 mg Oral $Given 9/19/24 1134)   ibuprofen (ADVIL/MOTRIN) tablet 600 mg (600 mg Oral Not Given 9/19/24 1134)       Procedures   Procedures     Splint Placement     Procedure: Splint Placement     Indication: Fracture    Consent: Verbal     Location: Left Ankle    Preparation: Wounds were cleansed and dressed with a non-adherent bandage     Procedure detail:   Splint was applied by Myself  Splint type: Posterior short leg and stirrup   Splint materilal: Fiberglass  After placement I  checked and adjusted the fit as needed to ensure proper positioning/fit   Sensation and circulation are intact after splint placement     Patient Status: The patient tolerated the procedure well: Yes. There were no complications.    Discussion of Management   None    ED Course   ED Course as of 09/19/24 1215   Thu Sep 19, 2024   1200 I obtained history and examined the patient as noted above         Additional Documentation  None    Medical Decision Making / Diagnosis     CMS Diagnoses: None    MIPS       None    MDM   Taisha Ordonez is a 54 year old female presents with injury to her left ankle. X-rays are consistent with left ankle fracture of the distal fibula. She will be placed in posterior and stirrup splint of the left ankle. Should remain non weight bearing until follow up with orthopedics. Recommend close orthopedic follow up in 5 days. Elevated, Ice. Limit pain prescription given to the patient. She is neurovascularly intact. Return precautions discussed including, increasing pain, swelling, developing numbness or coldness to her left lower extremity would prom,pt her return.    Disposition   The patient was discharged.     Diagnosis     ICD-10-CM    1. Ankle fracture, left, closed, initial encounter  S82.892A            Discharge Medications   New Prescriptions    OXYCODONE (ROXICODONE) 5 MG TABLET    Take 1 tablet (5 mg) by mouth every 6 hours as needed for pain.         Scribe Disclosure:  I, Aric Emmanuel, am serving as a scribe at 12:11 PM on 9/19/2024 to document services personally performed by Reece Gerardo PA-C based on my observations and the provider's statements to me.        Reece Gerardo PA-C  10/06/24 1798

## 2024-10-19 ENCOUNTER — HEALTH MAINTENANCE LETTER (OUTPATIENT)
Age: 54
End: 2024-10-19

## (undated) DEVICE — ENDO CANNULA 05MM VERSAONE UNIVERSAL UNVCA5STF

## (undated) DEVICE — PACK MINOR LITHOTOMY RIDGES

## (undated) DEVICE — PAD CHUX UNDERPAD 30X36" P3036C

## (undated) DEVICE — BAG CLEAR TRASH 1.3M 39X33" P4040C

## (undated) DEVICE — BLADE KNIFE SURG 10 371110

## (undated) DEVICE — SUCTION TIP YANKAUER W/O VENT K86

## (undated) DEVICE — LINEN FULL SHEET 5511

## (undated) DEVICE — SYR 05ML SLIP TIP W/O NDL 309647

## (undated) DEVICE — SOL NACL 0.9% IRRIG 1000ML BOTTLE 2F7124

## (undated) DEVICE — LINEN HALF SHEET 5512

## (undated) DEVICE — PACK SET-UP STD 9102

## (undated) DEVICE — TUBING SUCTION 12"X1/4" N612

## (undated) DEVICE — GLOVE PROTEXIS POWDER FREE 6.5 ORTHOPEDIC 2D73ET65

## (undated) DEVICE — PREP POVIDONE IODINE SCRUB 7.5% 120ML

## (undated) DEVICE — ESU HANDPIECE BIPOLAR THUNDERBEAT FC 5MMX35CM TB-0535FC

## (undated) DEVICE — TUBING VACUUM COLLECTION 6FT 23116

## (undated) DEVICE — SYR 10ML SLIP TIP W/O NDL 303134

## (undated) DEVICE — SUCTION CANISTER MEDIVAC LINER 3000ML W/LID 65651-530

## (undated) DEVICE — LINEN TOWEL PACK X5 5464

## (undated) DEVICE — GOWN IMPERVIOUS BREATHABLE SMART XLG 89045

## (undated) DEVICE — NDL INSUFFLATION 14GA STEP S100000

## (undated) DEVICE — SOL NACL 0.9% IRRIG 3000ML BAG 2B7477

## (undated) DEVICE — ENDO TROCAR OPTICAL 05MM VERSAPORT PLUS W/FIX CAN ONB5STF

## (undated) DEVICE — LINEN POUCH DBL 5427

## (undated) DEVICE — GLOVE PROTEXIS BLUE W/NEU-THERA 6.0  2D73EB60

## (undated) DEVICE — HEMOSTAT ABSORBABLE ARISTA 5GM SM0007-USA

## (undated) DEVICE — GLOVE PROTEXIS W/NEU-THERA 7.5  2D73TE75

## (undated) DEVICE — GLOVE PROTEXIS W/NEU-THERA 7.0  2D73TE70

## (undated) DEVICE — SPONGE LAP 18X18" X8435

## (undated) DEVICE — SUCTION CANNULA UTERINE 08MM CVD  20317

## (undated) DEVICE — SU MONOCRYL 4-0 PS-2 18" UND Y496G

## (undated) DEVICE — LINEN TOWEL PACK X10 5473

## (undated) DEVICE — SUCTION IRR STRYKERFLOW II W/TIP 250-070-520

## (undated) DEVICE — NDL COUNTER 20CT 31142493

## (undated) DEVICE — SU VICRYL 0 CT-1 CR 8X18" J740D

## (undated) DEVICE — SPECIMEN TRAP VACUUM SUCTION 003984-901

## (undated) DEVICE — PACK LAP HYST RIDGES

## (undated) DEVICE — RETR ELEV / UTERINE MANIPULATOR V-CARE LG CUP 60-6085-202A

## (undated) DEVICE — PREP POVIDONE IODINE SOLUTION 10% 120ML

## (undated) DEVICE — EVAC SYSTEM CLEAR FLOW SC082500

## (undated) DEVICE — FILTER BERKLEY SAFETOUCH

## (undated) RX ORDER — DEXAMETHASONE SODIUM PHOSPHATE 4 MG/ML
INJECTION, SOLUTION INTRA-ARTICULAR; INTRALESIONAL; INTRAMUSCULAR; INTRAVENOUS; SOFT TISSUE
Status: DISPENSED
Start: 2018-05-01

## (undated) RX ORDER — GLYCOPYRROLATE 0.2 MG/ML
INJECTION INTRAMUSCULAR; INTRAVENOUS
Status: DISPENSED
Start: 2018-05-01

## (undated) RX ORDER — FENTANYL CITRATE 50 UG/ML
INJECTION, SOLUTION INTRAMUSCULAR; INTRAVENOUS
Status: DISPENSED
Start: 2017-06-27

## (undated) RX ORDER — GLYCOPYRROLATE 0.2 MG/ML
INJECTION INTRAMUSCULAR; INTRAVENOUS
Status: DISPENSED
Start: 2017-06-27

## (undated) RX ORDER — OXYCODONE HYDROCHLORIDE 5 MG/1
TABLET ORAL
Status: DISPENSED
Start: 2018-05-01

## (undated) RX ORDER — LIDOCAINE HYDROCHLORIDE 10 MG/ML
INJECTION, SOLUTION EPIDURAL; INFILTRATION; INTRACAUDAL; PERINEURAL
Status: DISPENSED
Start: 2018-05-01

## (undated) RX ORDER — FENTANYL CITRATE 50 UG/ML
INJECTION, SOLUTION INTRAMUSCULAR; INTRAVENOUS
Status: DISPENSED
Start: 2018-05-01

## (undated) RX ORDER — ONDANSETRON 2 MG/ML
INJECTION INTRAMUSCULAR; INTRAVENOUS
Status: DISPENSED
Start: 2018-05-01

## (undated) RX ORDER — ACETAMINOPHEN 325 MG/1
TABLET ORAL
Status: DISPENSED
Start: 2017-06-27

## (undated) RX ORDER — ONDANSETRON 2 MG/ML
INJECTION INTRAMUSCULAR; INTRAVENOUS
Status: DISPENSED
Start: 2017-06-27

## (undated) RX ORDER — PROPOFOL 10 MG/ML
INJECTION, EMULSION INTRAVENOUS
Status: DISPENSED
Start: 2018-05-01

## (undated) RX ORDER — DEXAMETHASONE SODIUM PHOSPHATE 4 MG/ML
INJECTION, SOLUTION INTRA-ARTICULAR; INTRALESIONAL; INTRAMUSCULAR; INTRAVENOUS; SOFT TISSUE
Status: DISPENSED
Start: 2017-06-27

## (undated) RX ORDER — NEOSTIGMINE METHYLSULFATE 1 MG/ML
VIAL (ML) INJECTION
Status: DISPENSED
Start: 2018-05-01

## (undated) RX ORDER — KETOROLAC TROMETHAMINE 30 MG/ML
INJECTION, SOLUTION INTRAMUSCULAR; INTRAVENOUS
Status: DISPENSED
Start: 2017-06-27

## (undated) RX ORDER — PROPOFOL 10 MG/ML
INJECTION, EMULSION INTRAVENOUS
Status: DISPENSED
Start: 2017-06-27

## (undated) RX ORDER — LIDOCAINE HYDROCHLORIDE 10 MG/ML
INJECTION, SOLUTION EPIDURAL; INFILTRATION; INTRACAUDAL; PERINEURAL
Status: DISPENSED
Start: 2017-06-27

## (undated) RX ORDER — CEFAZOLIN SODIUM 2 G/100ML
INJECTION, SOLUTION INTRAVENOUS
Status: DISPENSED
Start: 2017-06-27

## (undated) RX ORDER — CEFAZOLIN SODIUM 2 G/100ML
INJECTION, SOLUTION INTRAVENOUS
Status: DISPENSED
Start: 2018-05-01